# Patient Record
Sex: MALE | Race: WHITE | Employment: FULL TIME | URBAN - METROPOLITAN AREA
[De-identification: names, ages, dates, MRNs, and addresses within clinical notes are randomized per-mention and may not be internally consistent; named-entity substitution may affect disease eponyms.]

---

## 2017-03-27 ENCOUNTER — TELEPHONE (OUTPATIENT)
Dept: FAMILY MEDICINE | Facility: OTHER | Age: 42
End: 2017-03-27

## 2017-03-27 DIAGNOSIS — K64.4 EXTERNAL HEMORRHOIDS: Primary | ICD-10-CM

## 2017-03-27 NOTE — TELEPHONE ENCOUNTER
Advised to call back directly if there are further questions, or if these symptoms fail to improve as anticipated or worsen.

## 2017-06-07 DIAGNOSIS — F41.0 PANIC DISORDER WITHOUT AGORAPHOBIA: ICD-10-CM

## 2017-06-07 RX ORDER — LORAZEPAM 1 MG/1
TABLET ORAL
Qty: 30 TABLET | Refills: 0 | Status: SHIPPED | OUTPATIENT
Start: 2017-06-07 | End: 2017-09-01

## 2017-09-01 DIAGNOSIS — F41.0 PANIC DISORDER WITHOUT AGORAPHOBIA: ICD-10-CM

## 2017-09-01 RX ORDER — LORAZEPAM 1 MG/1
TABLET ORAL
Qty: 30 TABLET | Refills: 0 | Status: SHIPPED | OUTPATIENT
Start: 2017-09-01 | End: 2017-11-21

## 2017-09-01 NOTE — TELEPHONE ENCOUNTER
Ativan      Last Written Prescription Date: 6-7-2017  Last Fill Quantity: 30,  # refills: 0   Last Office Visit with G, [facility] or [facility] prescribing provider: 11-

## 2017-10-25 DIAGNOSIS — K21.00 GASTROESOPHAGEAL REFLUX DISEASE WITH ESOPHAGITIS: ICD-10-CM

## 2017-10-27 NOTE — TELEPHONE ENCOUNTER
Omeprazole      Last Written Prescription Date: 11/23/16  Last Fill Quantity: 90,  # refills: 3   Last Office Visit with G, P or [facility] prescribing provider: 11/23/16

## 2017-10-30 RX ORDER — OMEPRAZOLE 40 MG/1
CAPSULE, DELAYED RELEASE ORAL
Qty: 90 CAPSULE | Refills: 0 | Status: SHIPPED | OUTPATIENT
Start: 2017-10-30 | End: 2018-01-28

## 2017-11-21 DIAGNOSIS — F41.0 PANIC DISORDER WITHOUT AGORAPHOBIA: ICD-10-CM

## 2017-11-22 RX ORDER — LORAZEPAM 1 MG/1
TABLET ORAL
Qty: 30 TABLET | Refills: 0 | Status: SHIPPED | OUTPATIENT
Start: 2017-11-22 | End: 2018-02-08

## 2017-11-22 NOTE — TELEPHONE ENCOUNTER
Ativan      Last Written Prescription Date: 9/1/17  Last Fill Quantity: 30,  # refills: 0   Last Office Visit with G, [facility] or [facility] prescribing provider: 11/23/16

## 2018-01-28 DIAGNOSIS — K21.00 GASTROESOPHAGEAL REFLUX DISEASE WITH ESOPHAGITIS: ICD-10-CM

## 2018-01-29 RX ORDER — OMEPRAZOLE 40 MG/1
CAPSULE, DELAYED RELEASE ORAL
Qty: 90 CAPSULE | Refills: 0 | Status: SHIPPED | OUTPATIENT
Start: 2018-01-29 | End: 2018-04-29

## 2018-02-08 DIAGNOSIS — F41.0 PANIC DISORDER WITHOUT AGORAPHOBIA: ICD-10-CM

## 2018-02-09 RX ORDER — LORAZEPAM 1 MG/1
TABLET ORAL
Qty: 30 TABLET | Refills: 0 | Status: SHIPPED | OUTPATIENT
Start: 2018-02-09 | End: 2018-05-14

## 2018-02-09 NOTE — TELEPHONE ENCOUNTER
Ativan      Last Written Prescription Date:  11/22/17  Last Fill Quantity: 30,   # refills: 0  Last Office Visit: 11/23/16  Future Office visit:       Routing refill request to provider for review/approval because:  Drug not on the [facility], P or [facility] refill protocol or controlled substance

## 2018-04-29 DIAGNOSIS — K21.00 GASTROESOPHAGEAL REFLUX DISEASE WITH ESOPHAGITIS: ICD-10-CM

## 2018-04-30 RX ORDER — OMEPRAZOLE 40 MG/1
CAPSULE, DELAYED RELEASE ORAL
Qty: 30 CAPSULE | Refills: 0 | Status: SHIPPED | OUTPATIENT
Start: 2018-04-30 | End: 2018-05-14

## 2018-04-30 NOTE — TELEPHONE ENCOUNTER
Patient is due for a fu appointment.  A letter was sent out 1/2018.  Patient is scheduled for 5/14/18.  Kasie Milian

## 2018-05-14 ENCOUNTER — OFFICE VISIT (OUTPATIENT)
Dept: FAMILY MEDICINE | Facility: OTHER | Age: 43
End: 2018-05-14
Attending: NURSE PRACTITIONER
Payer: COMMERCIAL

## 2018-05-14 VITALS
RESPIRATION RATE: 14 BRPM | HEART RATE: 80 BPM | BODY MASS INDEX: 32.28 KG/M2 | DIASTOLIC BLOOD PRESSURE: 70 MMHG | WEIGHT: 238 LBS | SYSTOLIC BLOOD PRESSURE: 100 MMHG

## 2018-05-14 DIAGNOSIS — S39.012A STRAIN OF LUMBAR REGION, INITIAL ENCOUNTER: ICD-10-CM

## 2018-05-14 DIAGNOSIS — J30.1 CHRONIC SEASONAL ALLERGIC RHINITIS DUE TO POLLEN: Primary | ICD-10-CM

## 2018-05-14 DIAGNOSIS — F41.0 PANIC DISORDER WITHOUT AGORAPHOBIA: ICD-10-CM

## 2018-05-14 DIAGNOSIS — K21.00 GASTROESOPHAGEAL REFLUX DISEASE WITH ESOPHAGITIS: ICD-10-CM

## 2018-05-14 DIAGNOSIS — Z71.6 TOBACCO ABUSE COUNSELING: ICD-10-CM

## 2018-05-14 DIAGNOSIS — Z72.0 TOBACCO ABUSE: ICD-10-CM

## 2018-05-14 DIAGNOSIS — I10 ESSENTIAL HYPERTENSION, BENIGN: ICD-10-CM

## 2018-05-14 DIAGNOSIS — R79.89 LFT ELEVATION: ICD-10-CM

## 2018-05-14 LAB
ALBUMIN SERPL-MCNC: 4.2 G/DL (ref 3.4–5)
ALP SERPL-CCNC: 62 U/L (ref 40–150)
ALT SERPL W P-5'-P-CCNC: 242 U/L (ref 0–70)
ANION GAP SERPL CALCULATED.3IONS-SCNC: 9 MMOL/L (ref 3–14)
AST SERPL W P-5'-P-CCNC: 141 U/L (ref 0–45)
BILIRUB SERPL-MCNC: 0.5 MG/DL (ref 0.2–1.3)
BUN SERPL-MCNC: 13 MG/DL (ref 7–30)
CALCIUM SERPL-MCNC: 8.7 MG/DL (ref 8.5–10.1)
CHLORIDE SERPL-SCNC: 106 MMOL/L (ref 94–109)
CHOLEST SERPL-MCNC: 147 MG/DL
CO2 SERPL-SCNC: 24 MMOL/L (ref 20–32)
CREAT SERPL-MCNC: 0.8 MG/DL (ref 0.66–1.25)
GFR SERPL CREATININE-BSD FRML MDRD: >90 ML/MIN/1.7M2
GLUCOSE SERPL-MCNC: 94 MG/DL (ref 70–99)
HDLC SERPL-MCNC: 32 MG/DL
LDLC SERPL CALC-MCNC: 95 MG/DL
NONHDLC SERPL-MCNC: 115 MG/DL
POTASSIUM SERPL-SCNC: 3.8 MMOL/L (ref 3.4–5.3)
PROT SERPL-MCNC: 8.7 G/DL (ref 6.8–8.8)
SODIUM SERPL-SCNC: 139 MMOL/L (ref 133–144)
TRIGL SERPL-MCNC: 100 MG/DL
TSH SERPL DL<=0.005 MIU/L-ACNC: 1.25 MU/L (ref 0.4–4)

## 2018-05-14 PROCEDURE — 99214 OFFICE O/P EST MOD 30 MIN: CPT | Performed by: NURSE PRACTITIONER

## 2018-05-14 PROCEDURE — 80053 COMPREHEN METABOLIC PANEL: CPT | Performed by: NURSE PRACTITIONER

## 2018-05-14 PROCEDURE — 80061 LIPID PANEL: CPT | Performed by: NURSE PRACTITIONER

## 2018-05-14 PROCEDURE — 84443 ASSAY THYROID STIM HORMONE: CPT | Performed by: NURSE PRACTITIONER

## 2018-05-14 PROCEDURE — 36415 COLL VENOUS BLD VENIPUNCTURE: CPT | Performed by: NURSE PRACTITIONER

## 2018-05-14 RX ORDER — MONTELUKAST SODIUM 10 MG/1
10 TABLET ORAL AT BEDTIME
Qty: 90 TABLET | Refills: 1 | Status: SHIPPED | OUTPATIENT
Start: 2018-05-14 | End: 2018-10-23

## 2018-05-14 RX ORDER — LISINOPRIL 10 MG/1
TABLET ORAL
Qty: 90 TABLET | Refills: 1 | Status: SHIPPED | OUTPATIENT
Start: 2018-05-14 | End: 2018-10-23

## 2018-05-14 RX ORDER — LORAZEPAM 1 MG/1
TABLET ORAL
Qty: 30 TABLET | Refills: 0 | Status: SHIPPED | OUTPATIENT
Start: 2018-05-14 | End: 2018-06-20

## 2018-05-14 RX ORDER — IBUPROFEN 800 MG/1
800 TABLET, FILM COATED ORAL EVERY 8 HOURS PRN
Qty: 90 TABLET | Refills: 1 | Status: SHIPPED | OUTPATIENT
Start: 2018-05-14 | End: 2019-09-11

## 2018-05-14 RX ORDER — OMEPRAZOLE 40 MG/1
CAPSULE, DELAYED RELEASE ORAL
Qty: 90 CAPSULE | Refills: 1 | Status: SHIPPED | OUTPATIENT
Start: 2018-05-14 | End: 2018-11-03

## 2018-05-14 RX ORDER — AMLODIPINE BESYLATE 10 MG/1
10 TABLET ORAL DAILY
Qty: 90 TABLET | Refills: 1 | Status: SHIPPED | OUTPATIENT
Start: 2018-05-14 | End: 2018-10-23

## 2018-05-14 ASSESSMENT — ANXIETY QUESTIONNAIRES
IF YOU CHECKED OFF ANY PROBLEMS ON THIS QUESTIONNAIRE, HOW DIFFICULT HAVE THESE PROBLEMS MADE IT FOR YOU TO DO YOUR WORK, TAKE CARE OF THINGS AT HOME, OR GET ALONG WITH OTHER PEOPLE: NOT DIFFICULT AT ALL
4. TROUBLE RELAXING: NOT AT ALL
7. FEELING AFRAID AS IF SOMETHING AWFUL MIGHT HAPPEN: NOT AT ALL
3. WORRYING TOO MUCH ABOUT DIFFERENT THINGS: NOT AT ALL
1. FEELING NERVOUS, ANXIOUS, OR ON EDGE: SEVERAL DAYS
2. NOT BEING ABLE TO STOP OR CONTROL WORRYING: NOT AT ALL
5. BEING SO RESTLESS THAT IT IS HARD TO SIT STILL: SEVERAL DAYS
GAD7 TOTAL SCORE: 2
6. BECOMING EASILY ANNOYED OR IRRITABLE: NOT AT ALL

## 2018-05-14 ASSESSMENT — PAIN SCALES - GENERAL: PAINLEVEL: NO PAIN (0)

## 2018-05-14 NOTE — PATIENT INSTRUCTIONS
1. Essential hypertension, benign  - lisinopril (PRINIVIL/ZESTRIL) 10 MG tablet; TAKE 1 TABLET DAILY  Dispense: 90 tablet; Refill: 1  - amLODIPine (NORVASC) 10 MG tablet; Take 1 tablet (10 mg) by mouth daily  Dispense: 90 tablet; Refill: 1  - TSH with free T4 reflex  - Lipid Profile  - Comprehensive metabolic panel    2. Panic disorder without agoraphobia  - LORazepam (ATIVAN) 1 MG tablet; TAKE 1 TABLET BY MOUTH DAILY AS NEEDED FOR ANXIETY  Dispense: 30 tablet; Refill: 0    3. Strain of lumbar region, initial encounter  - ibuprofen (ADVIL/MOTRIN) 800 MG tablet; Take 1 tablet (800 mg) by mouth every 8 hours as needed for moderate pain  Dispense: 90 tablet; Refill: 1    4. Chronic seasonal allergic rhinitis due to pollen  - montelukast (SINGULAIR) 10 MG tablet; Take 1 tablet (10 mg) by mouth At Bedtime  Dispense: 90 tablet; Refill: 1    5. Tobacco abuse  - Tobacco Cessation - Order to Satisfy Health Maintenance    6. Tobacco abuse counseling  - Tobacco Cessation - Order to Satisfy Health Maintenance    7. Gastroesophageal reflux disease with esophagitis  - omeprazole (PRILOSEC) 40 MG capsule; TAKE 1 CAPSULE DAILY 30 TO 60 MINUTES BEFORE A MEAL  Dispense: 90 capsule; Refill: 1        My nurse will call you with your labs  FU 6 months          Lorena Blake NP  [facility]        HOW TO QUIT SMOKING  Smoking is one of the hardest habits to break. About half of all those who have ever smoked have been able to quit, and most of those (about 70%) who still smoke want to quit. Here are some of the best ways to stop smoking.     KEEP TRYING:  It takes most smokers about 8 tries before they are finally able to fully quit. So, the more often you try and fail, the better your chance of quitting the next time! So, don't give up!    GO COLD TURKEY:  Most ex-smokers quit cold turkey. Trying to cut back gradually doesn't seem to work as well, perhaps because it continues the smoking habit. Also, it is possible to  fool yourself by inhaling more while smoking fewer cigarettes. This results in the same amount of nicotine in your body!    GET SUPPORT:  Support programs can make an important difference, especially for the heavy smoker. These groups offer lectures, methods to change your behavior and peer support. Call the free national Quitline for more information. 800-QUIT-NOW (218-358-7758). Low-cost or free programs are offered by many hospitals, local chapters of the American Lung Association (026-572-0688) and the American Cancer Society (351-131-5896). Support at home is important too. Non-smokers can help by offering praise and encouragement. If the smoker fails to quit, encourage them to try again!    OVER-THE-COUNTER MEDICINES:  For those who can't quit on their own, Nicotine Replacement Therapy (NRT) may make quitting much easier. Certain aids such as the nicotine patch, gum and lozenge are available without a prescription. However, it is best to use these under the guidance of your doctor. The skin patch provides a steady supply of nicotine to the body. Nicotine gum and lozenge gives temporary bursts of low levels of nicotine. Both methods take the edge off the craving for cigarettes. WARNING: If you feel symptoms of nicotine overdose, such as nausea, vomiting, dizziness, weakness, or fast heartbeat, stop using these and see your doctor.    PRESCRIPTION MEDICINES:  After evaluating your smoking patterns and prior attempts at quitting, your doctor may offer a prescription medicine such as bupropion (Zyban, Wellbutrin), varenicline (Chantix, Champix), a niocotine inhaler or nasal spray. Each has its unique advantage and side effects which your doctor can review with you.    HEALTH BENEFITS OF QUITTING:  The benefits of quitting start right away and keep improving the longer you go without smokin minutes: blood pressure and pulse return to normal  8 hours: oxygen levels return to normal  2 days: ability to smell  and taste begins to improve as damaged nerves start to regrow  2-3 weeks: circulation and lung function improves  1-9 months: decreased cough, congestion and shortness of breath; less tired  1 year: risk of heart attack decreases by half  5 years: risk of lung cancer decreases by half; risk of stroke becomes the same as a non-smoker  For information about how to quit smoking, visit the following links:  National Cancer [place] ,   Clearing the Air, Quit Smoking Today   - an online booklet. http://www.smokefree.gov/pubs/clearing_the_air.pdf  Smokefree.gov http://smokefree.gov/  QuitNet http://www.quitnet.com/    1514-5239 Sharlatammy Enrike, [address] [place] [address], [place] [id number]. All rights reserved. This information is not intended as a substitute for professional medical care. Always follow your healthcare professional's instructions.    The Benefits of Living Smoke Free  What do you want to gain from quitting? Check off some reasons to quit.  Health Benefits  ___ Reduce my risk of lung cancer, heart disease, chronic lung disease  ___ Have fewer wrinkles and softer skin  ___ Improve my sense of taste and smell  ___ For pregnant women reduce the risk of having a miscarriage, stillbirth, premature birth, or low-birth-weight baby  Personal Benefits  ___ Feel more in control of my life  ___ Have better-smelling hair, breath, clothes, home, and car  ___ Save time by not having to take smoke breaks, buy cigarettes, or hunt for a light  ___ Have whiter teeth  Family Benefits  ___ Reduce my children s respiratory tract infections  ___ Set a good example for my children  ___ Reduce my family s cancer risk  Financial Benefits  ___ Save hundreds of dollars each year that would be spent on cigarettes  ___ Save money on medical bills  ___ Save on life, health, and car insurance premiums    Those Dollars Add Up!  Cigarettes are expensive, and getting more expensive all the time. Do you realize how much money you are spending  on cigarettes per year? What is the average amount you spend on a pack of cigarettes? What is the average number of packs that you smoke per day? Using your answers to these questions, fill in this formula to help you find out:  ($ _____ per pack) ×  ( _____ number of packs per day) × (365 days) =  $ _____ yearly cost of smoking  Besides tobacco, there are other costs, including extra cleaning bills and replacement costs for clothing and furniture; medical expenses for smoking-related illnesses; and higher health, life, and car insurance premiums.    Cigars and Pipes Count Too!  Cigars and pipes are also dangerous. So are smokeless (chewing) tobacco and snuff. All of these products contain nicotine, a highly addictive substance that has harmful effects on your body. Quitting smoking means giving up all tobacco products.      5324-5510 Sheila \A Chronology [facility] [place] [facility]\"", [address] [place] [address], [place] [address]. All rights reserved. This information is not intended as a substitute for professional medical care. Always follow your healthcare professional's instructions.

## 2018-05-14 NOTE — MR AVS SNAPSHOT
After Visit Summary   5/14/2018    Deepak Quevedo    MRN: [id number]           Patient Information     Date Of Birth          1975        Visit Information        Provider Department      5/14/2018 9:45 AM Lorena Blake NP [facility]        Today's Diagnoses     Chronic seasonal allergic rhinitis due to pollen    -  1    Essential hypertension, benign        Panic disorder without agoraphobia        Strain of lumbar region, initial encounter        Tobacco abuse        Tobacco abuse counseling        Gastroesophageal reflux disease with esophagitis          Care Instructions        1. Essential hypertension, benign  - lisinopril (PRINIVIL/ZESTRIL) 10 MG tablet; TAKE 1 TABLET DAILY  Dispense: 90 tablet; Refill: 1  - amLODIPine (NORVASC) 10 MG tablet; Take 1 tablet (10 mg) by mouth daily  Dispense: 90 tablet; Refill: 1  - TSH with free T4 reflex  - Lipid Profile  - Comprehensive metabolic panel    2. Panic disorder without agoraphobia  - LORazepam (ATIVAN) 1 MG tablet; TAKE 1 TABLET BY MOUTH DAILY AS NEEDED FOR ANXIETY  Dispense: 30 tablet; Refill: 0    3. Strain of lumbar region, initial encounter  - ibuprofen (ADVIL/MOTRIN) 800 MG tablet; Take 1 tablet (800 mg) by mouth every 8 hours as needed for moderate pain  Dispense: 90 tablet; Refill: 1    4. Chronic seasonal allergic rhinitis due to pollen  - montelukast (SINGULAIR) 10 MG tablet; Take 1 tablet (10 mg) by mouth At Bedtime  Dispense: 90 tablet; Refill: 1    5. Tobacco abuse  - Tobacco Cessation - Order to Satisfy Health Maintenance    6. Tobacco abuse counseling  - Tobacco Cessation - Order to Satisfy Health Maintenance    7. Gastroesophageal reflux disease with esophagitis  - omeprazole (PRILOSEC) 40 MG capsule; TAKE 1 CAPSULE DAILY 30 TO 60 MINUTES BEFORE A MEAL  Dispense: 90 capsule; Refill: 1        My nurse will call you with your labs  FU 6 months          Lorena Blake NP  [facility]        HOW TO QUIT  SMOKING  Smoking is one of the hardest habits to break. About half of all those who have ever smoked have been able to quit, and most of those (about 70%) who still smoke want to quit. Here are some of the best ways to stop smoking.     KEEP TRYING:  It takes most smokers about 8 tries before they are finally able to fully quit. So, the more often you try and fail, the better your chance of quitting the next time! So, don't give up!    GO COLD TURKEY:  Most ex-smokers quit cold turkey. Trying to cut back gradually doesn't seem to work as well, perhaps because it continues the smoking habit. Also, it is possible to fool yourself by inhaling more while smoking fewer cigarettes. This results in the same amount of nicotine in your body!    GET SUPPORT:  Support programs can make an important difference, especially for the heavy smoker. These groups offer lectures, methods to change your behavior and peer support. Call the free national Quitline for more information. 800-QUIT-NOW (143-994-1093). Low-cost or free programs are offered by many hospitals, local chapters of the American Lung Association (291-387-8083) and the American Cancer Society (730-656-7986). Support at home is important too. Non-smokers can help by offering praise and encouragement. If the smoker fails to quit, encourage them to try again!    OVER-THE-COUNTER MEDICINES:  For those who can't quit on their own, Nicotine Replacement Therapy (NRT) may make quitting much easier. Certain aids such as the nicotine patch, gum and lozenge are available without a prescription. However, it is best to use these under the guidance of your doctor. The skin patch provides a steady supply of nicotine to the body. Nicotine gum and lozenge gives temporary bursts of low levels of nicotine. Both methods take the edge off the craving for cigarettes. WARNING: If you feel symptoms of nicotine overdose, such as nausea, vomiting, dizziness, weakness, or fast heartbeat, stop  using these and see your doctor.    PRESCRIPTION MEDICINES:  After evaluating your smoking patterns and prior attempts at quitting, your doctor may offer a prescription medicine such as bupropion (Zyban, Wellbutrin), varenicline (Chantix, Champix), a niocotine inhaler or nasal spray. Each has its unique advantage and side effects which your doctor can review with you.    HEALTH BENEFITS OF QUITTING:  The benefits of quitting start right away and keep improving the longer you go without smokin minutes: blood pressure and pulse return to normal  8 hours: oxygen levels return to normal  2 days: ability to smell and taste begins to improve as damaged nerves start to regrow  2-3 weeks: circulation and lung function improves  1-9 months: decreased cough, congestion and shortness of breath; less tired  1 year: risk of heart attack decreases by half  5 years: risk of lung cancer decreases by half; risk of stroke becomes the same as a non-smoker  For information about how to quit smoking, visit the following links:  National Cancer [place] ,   Clearing the Air, Quit Smoking Today   - an online booklet. http://www.smokefree.gov/pubs/clearing_the_air.pdf  Smokefree.gov http://smokefree.gov/  QuitNet http://www.quitnet.com/    3380-9220 [facility], [address] [place] [address], [place] [address]. All rights reserved. This information is not intended as a substitute for professional medical care. Always follow your healthcare professional's instructions.    The Benefits of Living Smoke Free  What do you want to gain from quitting? Check off some reasons to quit.  Health Benefits  ___ Reduce my risk of lung cancer, heart disease, chronic lung disease  ___ Have fewer wrinkles and softer skin  ___ Improve my sense of taste and smell  ___ For pregnant women--reduce the risk of having a miscarriage, stillbirth, premature birth, or low-birth-weight baby  Personal Benefits  ___ Feel more in control of my life  ___ Have  better-smelling hair, breath, clothes, home, and car  ___ Save time by not having to take smoke breaks, buy cigarettes, or hunt for a light  ___ Have whiter teeth  Family Benefits  ___ Reduce my children s respiratory tract infections  ___ Set a good example for my children  ___ Reduce my family s cancer risk  Financial Benefits  ___ Save hundreds of dollars each year that would be spent on cigarettes  ___ Save money on medical bills  ___ Save on life, health, and car insurance premiums    Those Dollars Add Up!  Cigarettes are expensive, and getting more expensive all the time. Do you realize how much money you are spending on cigarettes per year? What is the average amount you spend on a pack of cigarettes? What is the average number of packs that you smoke per day? Using your answers to these questions, fill in this formula to help you find out:  ($ _____ per pack) ×  ( _____ number of packs per day) × (365 days) =  $ _____ yearly cost of smoking  Besides tobacco, there are other costs, including extra cleaning bills and replacement costs for clothing and furniture; medical expenses for smoking-related illnesses; and higher health, life, and car insurance premiums.    Cigars and Pipes Count Too!  Cigars and pipes are also dangerous. So are smokeless (chewing) tobacco and snuff. All of these products contain nicotine, a highly addictive substance that has harmful effects on your body. Quitting smoking means giving up all tobacco products.      5864-1132 [address], [place] [address]. All rights reserved. This information is not intended as a substitute for professional medical care. Always follow your healthcare professional's instructions.          Follow-ups after your visit        Who to contact     If you have questions or need follow up information about today's clinic visit or your schedule please contact [facility] directly at [phone].  Normal or non-critical  "lab and imaging results will be communicated to you by MyChart, letter or phone within 4 business days after the clinic has received the results. If you do not hear from us within 7 days, please contact the clinic through [employer] or phone. If you have a critical or abnormal lab result, we will notify you by phone as soon as possible.  Submit refill requests through [employer] or call your pharmacy and they will forward the refill request to us. Please allow 3 business days for your refill to be completed.          Additional Information About Your Visit        [employer] Information     [employer] lets you send messages to your doctor, view your test results, renew your prescriptions, schedule appointments and more. To sign up, go to [email or web address] . Click on \"Log in\" on the left side of the screen, which will take you to the Welcome page. Then click on \"Sign up Now\" on the right side of the page.     You will be asked to enter the access code listed below, as well as some personal information. Please follow the directions to create your username and password.     Your access code is: [email or web address]  Expires: 2018 10:18 AM     Your access code will  in 90 days. If you need help or a new code, please call your [place] clinic or [phone].        Care EveryWhere ID     This is your Care EveryWhere ID. This could be used by other organizations to access your [place] medical records  [id number]        Your Vitals Were     Pulse Respirations BMI (Body Mass Index)             80 14 32.28 kg/m2          Blood Pressure from Last 3 Encounters:   18 100/70   16 108/66   09/14/15 122/82    Weight from Last 3 Encounters:   18 238 lb (108 kg)   16 241 lb (109.3 kg)   09/14/15 230 lb (104.3 kg)              We Performed the Following     Comprehensive metabolic panel     Lipid Profile     Tobacco Cessation - Order to Satisfy Health Maintenance     TSH with free T4 reflex          Today's " Medication Changes          These changes are accurate as of 5/14/18 10:18 AM.  If you have any questions, ask your nurse or doctor.               These medicines have changed or have updated prescriptions.        Dose/Directions    amLODIPine 10 MG tablet   Commonly known as:  NORVASC   This may have changed:  See the new instructions.   Used for:  Essential hypertension, benign   Changed by:  Lorena Blake NP        Dose:  10 mg   Take 1 tablet (10 mg) by mouth daily   Quantity:  90 tablet   Refills:  1       lisinopril 10 MG tablet   Commonly known as:  PRINIVIL/ZESTRIL   This may have changed:  See the new instructions.   Used for:  Essential hypertension, benign   Changed by:  Lorena Blake NP        TAKE 1 TABLET DAILY   Quantity:  90 tablet   Refills:  1       LORazepam 1 MG tablet   Commonly known as:  ATIVAN   This may have changed:  See the new instructions.   Used for:  Panic disorder without agoraphobia   Changed by:  Lorena Blake NP        TAKE 1 TABLET BY MOUTH DAILY AS NEEDED FOR ANXIETY   Quantity:  30 tablet   Refills:  0       montelukast 10 MG tablet   Commonly known as:  SINGULAIR   This may have changed:  Another medication with the same name was removed. Continue taking this medication, and follow the directions you see here.   Used for:  Chronic seasonal allergic rhinitis due to pollen   Changed by:  Lorena Blake NP        Dose:  10 mg   Take 1 tablet (10 mg) by mouth At Bedtime   Quantity:  90 tablet   Refills:  1       omeprazole 40 MG capsule   Commonly known as:  priLOSEC   This may have changed:  See the new instructions.   Used for:  Gastroesophageal reflux disease with esophagitis   Changed by:  Lorena Blake NP        TAKE 1 CAPSULE DAILY 30 TO 60 MINUTES BEFORE A MEAL   Quantity:  90 capsule   Refills:  1            Where to get your medicines      These medications were sent to [facility] - [place], [place] - [address] [place], [place] [address] [facility]  [address]     Phone:  [phone]     amLODIPine 10 MG tablet    ibuprofen 800 MG tablet    lisinopril 10 MG tablet    montelukast 10 MG tablet    omeprazole 40 MG capsule         Some of these will need a paper prescription and others can be bought over the counter.  Ask your nurse if you have questions.     Bring a paper prescription for each of these medications     LORazepam 1 MG tablet                Primary Care Provider Office Phone # Fax #    Lorena Blake [phone] [address]  [place] [address]        Equal Access to Services     [facility]: Hadii aad ku hadasho Soomaali, waaxda luqadaha, qaybta kaalmada adeegyada, waxay elysiain haymaggie laurent . So [facility] [phone].    ATENCIÓN: Si habla español, tiene a massey disposición servicios gratuitos de asistencia lingüística. Llame al [phone].    We comply with applicable federal civil rights laws and [place] laws. We do not discriminate on the basis of race, color, national origin, age, disability, sex, sexual orientation, or gender identity.            Thank you!     Thank you for choosing [facility]  for your care. Our goal is always to provide you with excellent care. Hearing back from our patients is one way we can continue to improve our services. Please take a few minutes to complete the written survey that you may receive in the mail after your visit with us. Thank you!             Your Updated Medication List - Protect others around you: Learn how to safely use, store and throw away your medicines at www.disposemymeds.org.          This list is accurate as of 5/14/18 10:18 AM.  Always use your most recent med list.                   Brand Name Dispense Instructions for use Diagnosis    amLODIPine 10 MG tablet    NORVASC    90 tablet    Take 1 tablet (10 mg) by mouth daily    Essential hypertension, benign       aspirin 81 MG EC tablet     90 tablet    Take 1 tablet (81 mg) by mouth  daily    Essential hypertension, benign       atenolol 25 MG tablet    TENORMIN    15 tablet    TAKE 1 TABLET DAILY    Essential hypertension, benign       ibuprofen 800 MG tablet    ADVIL/MOTRIN    90 tablet    Take 1 tablet (800 mg) by mouth every 8 hours as needed for moderate pain    Strain of lumbar region, initial encounter       lisinopril 10 MG tablet    PRINIVIL/ZESTRIL    90 tablet    TAKE 1 TABLET DAILY    Essential hypertension, benign       LORazepam 1 MG tablet    ATIVAN    30 tablet    TAKE 1 TABLET BY MOUTH DAILY AS NEEDED FOR ANXIETY    Panic disorder without agoraphobia       montelukast 10 MG tablet    SINGULAIR    90 tablet    Take 1 tablet (10 mg) by mouth At Bedtime    Chronic seasonal allergic rhinitis due to pollen       omeprazole 40 MG capsule    priLOSEC    90 capsule    TAKE 1 CAPSULE DAILY 30 TO 60 MINUTES BEFORE A MEAL    Gastroesophageal reflux disease with esophagitis

## 2018-05-14 NOTE — PROGRESS NOTES
Labs look good other than LFT's - which are pretty high.  Ask about alcohol use please, also tylenol use.    Recheck LFT's in 2 weeks, order entered    Lorena MARCANOJames J. [facility]  [phone]

## 2018-05-14 NOTE — NURSING NOTE
Chief Complaint   Patient presents with     Chronic Disease Management     fasting       Initial /70 (BP Location: Right arm, Patient Position: Sitting, Cuff Size: Adult Large)  Pulse 80  Resp 14  Wt 238 lb (108 kg)  BMI 32.28 kg/m2 Estimated body mass index is 32.28 kg/(m^2) as calculated from the following:    Height as of 11/23/16: 6' (1.829 m).    Weight as of this encounter: 238 lb (108 kg).  Medication Reconciliation: complete     Sanjuana López LPN

## 2018-05-14 NOTE — PROGRESS NOTES
SUBJECTIVE:   Deepak Quevedo is a 42 year old male who presents to clinic today for the following health issues:        Hypertension Follow-up    Outpatient blood pressures are not being checked.    Low Salt Diet: not monitoring salt      lost 15 lbs, cutting down on smoking and quit drinking, /70 today      Panic attacks:  Takes ativan as needed, hasn't needed for when flew to [place], takes very rarely      GERD:  Taking omeprazole, doesn't work as well as it uaed to, but still can't go without.            Amount of exercise or physical activity: 6-7 days/week for an average of walking 5-6 hieu daily    Problems taking medications regularly: No    Medication side effects: none    Diet: regular (no restrictions)          Problem list and histories reviewed & adjusted, as indicated.  Additional history: as documented    Patient Active Problem List   Diagnosis     Essential hypertension, benign     GERD (gastroesophageal reflux disease)     Allergic rhinitis     Tobacco abuse     Panic disorder     Past Surgical History:   Procedure Laterality Date     CIRCUMCISION       ESOPHAGOSCOPY, GASTROSCOPY, DUODENOSCOPY (EGD), COMBINED  4/22/2013    Procedure: COMBINED ESOPHAGOSCOPY, GASTROSCOPY, DUODENOSCOPY (EGD);  UPPER ENDOSCOPY with Biopsy;  Surgeon: Ashley Narayanan DO;  Location: [facility]     TONSILLECTOMY         Social History   Substance Use Topics     Smoking status: Current Some Day Smoker     Packs/day: 0.20     Years: 15.00     Types: Cigarettes     Smokeless tobacco: Never Used      Comment: Longest period tobacco-free: 6 months; relapse due to social pressure; passive smoke exposure (yes)     Alcohol use Yes      Comment: 3 beers daily/occasionally (?)     Family History   Problem Relation Age of Onset     CANCER Maternal Grandmother      Cause of death     DIABETES Maternal Grandmother 67     DIABETES Maternal Grandfather 70     DIABETES Father          Current Outpatient Prescriptions   Medication  Sig Dispense Refill     amLODIPine (NORVASC) 10 MG tablet Take 1 tablet (10 mg) by mouth daily 90 tablet 1     aspirin 81 MG EC tablet Take 1 tablet (81 mg) by mouth daily 90 tablet 3     atenolol (TENORMIN) 25 MG tablet TAKE 1 TABLET DAILY 15 tablet 0     ibuprofen (ADVIL/MOTRIN) 800 MG tablet Take 1 tablet (800 mg) by mouth every 8 hours as needed for moderate pain 90 tablet 1     lisinopril (PRINIVIL/ZESTRIL) 10 MG tablet TAKE 1 TABLET DAILY 90 tablet 1     LORazepam (ATIVAN) 1 MG tablet TAKE 1 TABLET BY MOUTH DAILY AS NEEDED FOR ANXIETY 30 tablet 0     montelukast (SINGULAIR) 10 MG tablet Take 1 tablet (10 mg) by mouth At Bedtime 90 tablet 1     omeprazole (PRILOSEC) 40 MG capsule TAKE 1 CAPSULE DAILY 30 TO 60 MINUTES BEFORE A MEAL (DUE FOR OFFICE VISIT) 30 capsule 0     [DISCONTINUED] amLODIPine (NORVASC) 10 MG tablet TAKE 1 TABLET DAILY 15 tablet 0     [DISCONTINUED] lisinopril (PRINIVIL/ZESTRIL) 10 MG tablet TAKE 1 TABLET DAILY (DUE FOR OFFICE VISIT AND LABS) 15 tablet 0     [DISCONTINUED] montelukast (SINGULAIR) 10 MG tablet Take 1 tablet (10 mg) by mouth At Bedtime 30 tablet 0     [DISCONTINUED] montelukast (SINGULAIR) 10 MG tablet TAKE 1 TABLET AT BEDTIME 30 tablet 0     No Known Allergies  Recent Labs   Lab Test  11/23/16   1418  06/04/14   1456  12/12/13   1415   LDL   --   66  101   HDL   --   58  47   TRIG   --   151*  157*   CR  0.92  1.03  1.01   GFRESTIMATED  >90  Non  GFR Calc    82  84   GFRESTBLACK  >90   GFR Calc    >90  >90   POTASSIUM  3.7  4.6  4.9   TSH  1.00  0.59  0.67      BP Readings from Last 3 Encounters:   05/14/18 100/70   11/23/16 108/66   09/14/15 122/82    Wt Readings from Last 3 Encounters:   05/14/18 238 lb (108 kg)   11/23/16 241 lb (109.3 kg)   09/14/15 230 lb (104.3 kg)                  Labs reviewed in EPIC    Reviewed and updated as needed this visit by clinical staff  Tobacco  Allergies  Meds  Med Hx  Surg Hx  Fam Hx  Soc Hx       Reviewed and updated as needed this visit by Provider         ROS:  Constitutional, HEENT, cardiovascular, pulmonary, GI, , musculoskeletal, neuro, skin, endocrine and psych systems are negative, except as otherwise noted.    OBJECTIVE:     /70 (BP Location: Right arm, Patient Position: Sitting, Cuff Size: Adult Large)  Pulse 80  Resp 14  Wt 238 lb (108 kg)  BMI 32.28 kg/m2  Body mass index is 32.28 kg/(m^2).       GENERAL: healthy, alert and no distress  EYES: Eyes grossly normal to inspection, PERRL and conjunctivae and sclerae normal  HENT: ear canals and TM's normal, nose and mouth without ulcers or lesions  NECK: no adenopathy, no asymmetry, masses, or scars and thyroid normal to palpation  RESP: lungs clear to auscultation - no rales, rhonchi or wheezes  CV: regular rate and rhythm, normal S1 S2, no S3 or S4, no murmur, click or rub, no peripheral edema and peripheral pulses strong  ABDOMEN: soft, nontender, no hepatosplenomegaly, no masses and bowel sounds normal  MS: no gross musculoskeletal defects noted, no edema  SKIN: no suspicious lesions or rashes  PSYCH: mentation appears normal, affect normal/bright    Results for orders placed or performed in visit on 05/14/18 (from the past 24 hour(s))   TSH with free T4 reflex   Result Value Ref Range    TSH 1.25 0.40 - 4.00 mU/L   Lipid Profile   Result Value Ref Range    Cholesterol 147 <200 mg/dL    Triglycerides 100 <150 mg/dL    HDL Cholesterol 32 (L) >39 mg/dL    LDL Cholesterol Calculated 95 <100 mg/dL    Non HDL Cholesterol 115 <130 mg/dL   Comprehensive metabolic panel   Result Value Ref Range    Sodium 139 133 - 144 mmol/L    Potassium 3.8 3.4 - 5.3 mmol/L    Chloride 106 94 - 109 mmol/L    Carbon Dioxide 24 20 - 32 mmol/L    Anion Gap 9 3 - 14 mmol/L    Glucose 94 70 - 99 mg/dL    Urea Nitrogen 13 7 - 30 mg/dL    Creatinine 0.80 0.66 - 1.25 mg/dL    GFR Estimate >90 >60 mL/min/1.7m2    GFR Estimate If Black >90 >60 mL/min/1.7m2    Calcium  8.7 8.5 - 10.1 mg/dL    Bilirubin Total 0.5 0.2 - 1.3 mg/dL    Albumin 4.2 3.4 - 5.0 g/dL    Protein Total 8.7 6.8 - 8.8 g/dL    Alkaline Phosphatase 62 40 - 150 U/L     (H) 0 - 70 U/L     (H) 0 - 45 U/L         ASSESSMENT/PLAN:     Hypertension; controlled   Associated with the following complications:    None   Plan:  No changes in the patient's current treatment plan        Tobacco Cessation:   reports that he has been smoking Cigarettes.  He has a 3.00 pack-year smoking history. He has never used smokeless tobacco.  Tobacco Cessation Action Plan: Information offered: Patient not interested at this time  Self help information given to patient          1. Essential hypertension, benign  - lisinopril (PRINIVIL/ZESTRIL) 10 MG tablet; TAKE 1 TABLET DAILY  Dispense: 90 tablet; Refill: 1  - amLODIPine (NORVASC) 10 MG tablet; Take 1 tablet (10 mg) by mouth daily  Dispense: 90 tablet; Refill: 1  - TSH with free T4 reflex  - Lipid Profile  - Comprehensive metabolic panel    2. Panic disorder without agoraphobia  - LORazepam (ATIVAN) 1 MG tablet; TAKE 1 TABLET BY MOUTH DAILY AS NEEDED FOR ANXIETY  Dispense: 30 tablet; Refill: 0    3. Strain of lumbar region, initial encounter  - ibuprofen (ADVIL/MOTRIN) 800 MG tablet; Take 1 tablet (800 mg) by mouth every 8 hours as needed for moderate pain  Dispense: 90 tablet; Refill: 1    4. Chronic seasonal allergic rhinitis due to pollen  - montelukast (SINGULAIR) 10 MG tablet; Take 1 tablet (10 mg) by mouth At Bedtime  Dispense: 90 tablet; Refill: 1    5. Tobacco abuse  - Tobacco Cessation - Order to Satisfy Health Maintenance    6. Tobacco abuse counseling  - Tobacco Cessation - Order to Satisfy Health Maintenance    7. Gastroesophageal reflux disease with esophagitis  - omeprazole (PRILOSEC) 40 MG capsule; TAKE 1 CAPSULE DAILY 30 TO 60 MINUTES BEFORE A MEAL  Dispense: 90 capsule; Refill: 1        My nurse will call you with your labs  FU 6 months          Lorena Blake  NP  [facility]

## 2018-05-15 RX ORDER — LORAZEPAM 1 MG/1
TABLET ORAL
Qty: 30 TABLET | Refills: 0 | OUTPATIENT
Start: 2018-05-15

## 2018-05-15 ASSESSMENT — ANXIETY QUESTIONNAIRES: GAD7 TOTAL SCORE: 2

## 2018-05-15 ASSESSMENT — PATIENT HEALTH QUESTIONNAIRE - PHQ9: SUM OF ALL RESPONSES TO PHQ QUESTIONS 1-9: 4

## 2018-05-15 NOTE — TELEPHONE ENCOUNTER
Ativan  Last Written Prescription Date:  5/14/18  Last Fill Qty:  30, # Refills:  0  Last Office Visit:  5/14/18    Refill denied as it was just filled yesterday.

## 2018-05-30 DIAGNOSIS — R79.89 LFT ELEVATION: ICD-10-CM

## 2018-05-30 DIAGNOSIS — I10 ESSENTIAL HYPERTENSION, BENIGN: ICD-10-CM

## 2018-05-30 LAB
ALBUMIN SERPL-MCNC: 4.2 G/DL (ref 3.4–5)
ALP SERPL-CCNC: 73 U/L (ref 40–150)
ALT SERPL W P-5'-P-CCNC: 89 U/L (ref 0–70)
AST SERPL W P-5'-P-CCNC: 49 U/L (ref 0–45)
BILIRUB DIRECT SERPL-MCNC: 0.1 MG/DL (ref 0–0.2)
BILIRUB SERPL-MCNC: 0.3 MG/DL (ref 0.2–1.3)
PROT SERPL-MCNC: 8.2 G/DL (ref 6.8–8.8)

## 2018-05-30 PROCEDURE — 80076 HEPATIC FUNCTION PANEL: CPT | Performed by: NURSE PRACTITIONER

## 2018-05-30 PROCEDURE — 36415 COLL VENOUS BLD VENIPUNCTURE: CPT | Performed by: NURSE PRACTITIONER

## 2018-05-30 RX ORDER — ATENOLOL 25 MG/1
25 TABLET ORAL DAILY
Qty: 90 TABLET | Refills: 3 | Status: SHIPPED | OUTPATIENT
Start: 2018-05-30 | End: 2019-05-26

## 2018-06-20 DIAGNOSIS — F41.0 PANIC DISORDER WITHOUT AGORAPHOBIA: ICD-10-CM

## 2018-06-20 RX ORDER — LORAZEPAM 1 MG/1
TABLET ORAL
Qty: 30 TABLET | Refills: 0 | Status: SHIPPED | OUTPATIENT
Start: 2018-06-20 | End: 2018-10-23

## 2018-06-20 NOTE — TELEPHONE ENCOUNTER
lorazepam      Last Written Prescription Date:  5/14/18  Last Fill Quantity: 30,   # refills: 0  Last Office Visit: 5/14/18  Future Office visit:       Routing refill request to provider for review/approval because:  Drug not on the [facility], P or [facility] refill protocol or controlled substance

## 2018-10-23 DIAGNOSIS — J30.1 CHRONIC SEASONAL ALLERGIC RHINITIS DUE TO POLLEN: ICD-10-CM

## 2018-10-23 DIAGNOSIS — I10 ESSENTIAL HYPERTENSION, BENIGN: ICD-10-CM

## 2018-10-23 DIAGNOSIS — F41.0 PANIC DISORDER WITHOUT AGORAPHOBIA: ICD-10-CM

## 2018-10-23 RX ORDER — LORAZEPAM 1 MG/1
TABLET ORAL
Qty: 30 TABLET | Refills: 0 | Status: SHIPPED | OUTPATIENT
Start: 2018-10-23 | End: 2018-12-30

## 2018-10-23 NOTE — TELEPHONE ENCOUNTER
Lorazepam      Last Written Prescription Date:  6/20/18  Last Fill Quantity: 30,   # refills: 0  Last Office Visit: 5/14/18  Future Office visit:

## 2018-10-24 RX ORDER — AMLODIPINE BESYLATE 10 MG/1
TABLET ORAL
Qty: 90 TABLET | Refills: 1 | Status: SHIPPED | OUTPATIENT
Start: 2018-10-24 | End: 2019-04-21

## 2018-10-24 RX ORDER — LISINOPRIL 10 MG/1
TABLET ORAL
Qty: 90 TABLET | Refills: 1 | Status: SHIPPED | OUTPATIENT
Start: 2018-10-24 | End: 2019-04-21

## 2018-10-24 RX ORDER — MONTELUKAST SODIUM 10 MG/1
TABLET ORAL
Qty: 90 TABLET | Refills: 1 | Status: SHIPPED | OUTPATIENT
Start: 2018-10-24 | End: 2019-04-21

## 2018-12-30 DIAGNOSIS — F41.0 PANIC DISORDER WITHOUT AGORAPHOBIA: ICD-10-CM

## 2018-12-31 RX ORDER — LORAZEPAM 1 MG/1
TABLET ORAL
Qty: 30 TABLET | Refills: 0 | Status: SHIPPED | OUTPATIENT
Start: 2018-12-31 | End: 2019-02-25

## 2018-12-31 NOTE — TELEPHONE ENCOUNTER
LORAZEPAM 1MG TABLETS      Last Written Prescription Date:  10/23/18  Last Fill Quantity: 30,   # refills: 0  Last Office Visit: 5/14/18  Future Office visit:

## 2019-02-25 DIAGNOSIS — F41.0 PANIC DISORDER WITHOUT AGORAPHOBIA: ICD-10-CM

## 2019-02-26 RX ORDER — LORAZEPAM 1 MG/1
TABLET ORAL
Qty: 30 TABLET | Refills: 0 | Status: SHIPPED | OUTPATIENT
Start: 2019-02-26 | End: 2019-07-31

## 2019-04-21 DIAGNOSIS — J30.1 CHRONIC SEASONAL ALLERGIC RHINITIS DUE TO POLLEN: ICD-10-CM

## 2019-04-21 DIAGNOSIS — I10 ESSENTIAL HYPERTENSION, BENIGN: ICD-10-CM

## 2019-04-21 NOTE — LETTER
April 23, 2019      Deepak Quevedo  [address] [place] [id number]        Dear Deepak,       APPOINTMENT REMINDER:   Our records indicates that it is time for you to be seen for yearly exam     Your current medication requests will be approved for one refill but you will need to be seen before any additional refills can be approved.  Taking care of your health is important to us, and ongoing visits with your provider are vital to your care.    We look forward to seeing you in the near future.  You may call our office at [phone] to schedule a visit.     Please disregard this notice if you have already made an appointment.      Sincerely,        Lorena Blake NP

## 2019-04-23 RX ORDER — AMLODIPINE BESYLATE 10 MG/1
TABLET ORAL
Qty: 60 TABLET | Refills: 0 | Status: SHIPPED | OUTPATIENT
Start: 2019-04-23 | End: 2019-06-23

## 2019-04-23 RX ORDER — LISINOPRIL 10 MG/1
TABLET ORAL
Qty: 60 TABLET | Refills: 0 | Status: SHIPPED | OUTPATIENT
Start: 2019-04-23 | End: 2019-06-21

## 2019-04-23 RX ORDER — MONTELUKAST SODIUM 10 MG/1
TABLET ORAL
Qty: 60 TABLET | Refills: 0 | Status: SHIPPED | OUTPATIENT
Start: 2019-04-23 | End: 2019-06-21

## 2019-04-23 NOTE — TELEPHONE ENCOUNTER
Montelukast 10 mg      Last Written Prescription Date:  10/24/18  Last Fill Quantity: 90,   # refills: 1  Last Office Visit: 05/14/18  Future Office visit:       Amlodipine 10 mg      Last Written Prescription Date:  10/24/18  Last Fill Quantity: 90,   # refills: 1    Lisinopril 10 mg      Last Written Prescription Date:  10/24/18  Last Fill Quantity: 90,   # refills: 1

## 2019-05-01 DIAGNOSIS — K21.00 GASTROESOPHAGEAL REFLUX DISEASE WITH ESOPHAGITIS: ICD-10-CM

## 2019-05-02 RX ORDER — OMEPRAZOLE 40 MG/1
CAPSULE, DELAYED RELEASE ORAL
Qty: 90 CAPSULE | Refills: 0 | Status: SHIPPED | OUTPATIENT
Start: 2019-05-02 | End: 2019-07-30

## 2019-06-23 DIAGNOSIS — I10 ESSENTIAL HYPERTENSION, BENIGN: ICD-10-CM

## 2019-06-24 RX ORDER — AMLODIPINE BESYLATE 10 MG/1
TABLET ORAL
Qty: 20 TABLET | Refills: 0 | Status: SHIPPED | OUTPATIENT
Start: 2019-06-24 | End: 2019-08-28

## 2019-07-30 DIAGNOSIS — K21.00 GASTROESOPHAGEAL REFLUX DISEASE WITH ESOPHAGITIS: ICD-10-CM

## 2019-07-31 DIAGNOSIS — F41.0 PANIC DISORDER WITHOUT AGORAPHOBIA: ICD-10-CM

## 2019-07-31 RX ORDER — OMEPRAZOLE 40 MG/1
CAPSULE, DELAYED RELEASE ORAL
Qty: 30 CAPSULE | Refills: 0 | Status: SHIPPED | OUTPATIENT
Start: 2019-07-31 | End: 2019-09-06

## 2019-08-01 RX ORDER — LORAZEPAM 1 MG/1
TABLET ORAL
Qty: 30 TABLET | Refills: 0 | Status: SHIPPED | OUTPATIENT
Start: 2019-08-01 | End: 2019-08-28

## 2019-08-01 NOTE — TELEPHONE ENCOUNTER
LORazepam (ATIVAN) 1 MG tablet      Last Written Prescription Date:  2/26/19  Last Fill Quantity: 30,   # refills: 0  Last Office Visit: 5/14/18  Future Office visit:    Next 5 appointments (look out 90 days)    Aug 28, 2019  3:00 PM CDT  (Arrive by 2:45 PM)  PHYSICAL with Lorena Blake NP  [facility] ([facility] ) [address]  [facility] [address]  [phone]           Routing refill request to provider for review/approval because:  Drug not on the [facility], [facility] or [facility] refill protocol or controlled substance

## 2019-08-19 DIAGNOSIS — I10 ESSENTIAL HYPERTENSION, BENIGN: ICD-10-CM

## 2019-08-21 NOTE — TELEPHONE ENCOUNTER
atenolol (TENORMIN) 25 MG tablet    Last Written Prescription Date:  05/29/2019  Last Fill Quantity: 30,   # refills: 0  Last Office Visit: 05/14/2019  Future Office visit:    Next 5 appointments (look out 90 days)    Aug 28, 2019  3:00 PM CDT  (Arrive by 2:45 PM)  PHYSICAL with Lorena Blake NP  [facility] ([facility] ) [address]  [facility] [address]  [phone]           Routing refill request to provider for review/approval because:

## 2019-08-22 RX ORDER — ATENOLOL 25 MG/1
TABLET ORAL
Qty: 30 TABLET | Refills: 0 | Status: SHIPPED | OUTPATIENT
Start: 2019-08-22 | End: 2019-08-28

## 2019-08-27 NOTE — PATIENT INSTRUCTIONS
ASSESSMENT/PLAN:     1. Routine general medical examination at a health care facility  - Annual physical 1 year    2. Essential hypertension, benign  - amLODIPine (NORVASC) 10 MG tablet; Take 1 tablet (10 mg) by mouth daily  Dispense: 90 tablet; Refill: 1  - atenolol (TENORMIN) 25 MG tablet; Take 1 tablet (25 mg) by mouth daily  Dispense: 90 tablet; Refill: 1  - lisinopril (PRINIVIL/ZESTRIL) 10 MG tablet; Take 1 tablet (10 mg) by mouth daily  Dispense: 90 tablet; Refill: 1  - Lipid Profile; Future  - Comprehensive metabolic panel (BMP + Alb, Alk Phos, ALT, AST, Total. Bili, TP); Future  - TSH with free T4 reflex; Future    3. Gastroesophageal reflux disease without esophagitis  - GENERAL SURG ADULT REFERRAL    4. Panic disorder  - Ativan is refilled        COUNSELING:  Reviewed preventive health counseling, as reflected in patient instructions       Regular exercise       Healthy diet/nutrition    Estimated body mass index is 34.99 kg/m  as calculated from the following:    Height as of this encounter: 1.829 m (6').    Weight as of this encounter: 117 kg (258 lb).         reports that he has been smoking cigarettes.  He has a 3.00 pack-year smoking history. He has never used smokeless tobacco.  Tobacco Cessation Action Plan: Self help information given to patient    Counseling Resources:  ATP IV Guidelines  Pooled Cohorts Equation Calculator  FRAX Risk Assessment  ICSI Preventive Guidelines  Dietary Guidelines for Americans, 2010  USDA's MyPlate  ASA Prophylaxis  Lung CA Screening      Lorena Blake NP  [facility]          Preventive Health Recommendations  Male Ages 40 to 49    Yearly exam:             See your health care provider every year in order to  o   Review health changes.   o   Discuss preventive care.    o   Review your medicines if your doctor has prescribed any.    You should be tested each year for STDs (sexually transmitted diseases) if you re at risk.     Have a cholesterol  test every 5 years.     Have a colonoscopy (test for colon cancer) if someone in your family has had colon cancer or polyps before age 50.     After age 45, have a diabetes test (fasting glucose). If you are at risk for diabetes, you should have this test every 3 years.      Talk with your health care provider about whether or not a prostate cancer screening test (PSA) is right for you.    Shots: Get a flu shot each year. Get a tetanus shot every 10 years.     Nutrition:    Eat at least 5 servings of fruits and vegetables daily.     Eat whole-grain bread, whole-wheat pasta and brown rice instead of white grains and rice.     Get adequate Calcium and Vitamin D.     Lifestyle    Exercise for at least 150 minutes a week (30 minutes a day, 5 days a week). This will help you control your weight and prevent disease.     Limit alcohol to one drink per day.     No smoking.     Wear sunscreen to prevent skin cancer.     See your dentist every six months for an exam and cleaning.

## 2019-08-27 NOTE — PROGRESS NOTES
SUBJECTIVE:   CC: Deepak Quevedo is an 43 year old male who presents for preventive health visit.         Healthy Habits:    Do you get at least three servings of calcium containing foods daily (dairy, green leafy vegetables, etc.)? yes    Amount of exercise or daily activities, outside of work: No    Problems taking medications regularly No    Medication side effects: No    Have you had an eye exam in the past two years? yes    Do you see a dentist twice per year? yes    Do you have sleep apnea, excessive snoring or daytime drowsiness?yes      Hypertension Follow-up    Do you check your blood pressure regularly outside of the clinic? No     Are you following a low salt diet? Yes    Are your blood pressures ever more than 140 on the top number (systolic) OR more   than 90 on the bottom number (diastolic), for example 140/90? NA      Anxiety - social -  Follow-Up    How are you doing with your depression since your last visit? No change    How are you doing with your anxiety since your last visit?  No change    Are you having other symptoms that might be associated with depression or anxiety? No    Have you had a significant life event? No     Do you have any concerns with your use of alcohol or other drugs? No       GERD  - patient reports omprazole is not working well      Social History     Tobacco Use     Smoking status: Current Some Day Smoker     Packs/day: 0.20     Years: 15.00     Pack years: 3.00     Types: Cigarettes     Smokeless tobacco: Never Used     Tobacco comment: Longest period tobacco-free: 6 months; relapse due to social pressure; passive smoke exposure (yes)   Substance Use Topics     Alcohol use: Yes     Comment: 3 beers daily/occasionally (?)     Drug use: No     PHQ 11/23/2016 5/14/2018 8/28/2019   PHQ-9 Total Score 2 4 2   Q9: Thoughts of better off dead/self-harm past 2 weeks Not at all Not at all Not at all     EMELINA-7 SCORE 11/23/2016 5/14/2018 8/28/2019   Total Score 4 2 0       Suicide  Assessment Five-step Evaluation and Treatment (SAFE-T)      Abuse: Current or Past(Physical, Sexual or Emotional)- No  Do you feel safe in your environment? Yes    Social History     Tobacco Use     Smoking status: Current Some Day Smoker     Packs/day: 0.20     Years: 15.00     Pack years: 3.00     Types: Cigarettes     Smokeless tobacco: Never Used     Tobacco comment: Longest period tobacco-free: 6 months; relapse due to social pressure; passive smoke exposure (yes)   Substance Use Topics     Alcohol use: Yes     Comment: 3 beers daily/occasionally (?)     If you drink alcohol do you typically have >3 drinks per day or >7 drinks per week? No                      Last PSA: No results found for: PSA    Reviewed orders with patient. Reviewed health maintenance and updated orders accordingly - Yes  Lab work is in process  Labs reviewed in EPIC  BP Readings from Last 3 Encounters:   08/28/19 132/78   05/14/18 100/70   11/23/16 108/66    Wt Readings from Last 3 Encounters:   08/28/19 117 kg (258 lb)   05/14/18 108 kg (238 lb)   11/23/16 109.3 kg (241 lb)                  Patient Active Problem List   Diagnosis     Essential hypertension, benign     GERD (gastroesophageal reflux disease)     Allergic rhinitis     Tobacco abuse     Panic disorder     Past Surgical History:   Procedure Laterality Date     CIRCUMCISION       ESOPHAGOSCOPY, GASTROSCOPY, DUODENOSCOPY (EGD), COMBINED  4/22/2013    Procedure: COMBINED ESOPHAGOSCOPY, GASTROSCOPY, DUODENOSCOPY (EGD);  UPPER ENDOSCOPY with Biopsy;  Surgeon: Ashley Narayanan DO;  Location: [facility]     TONSILLECTOMY         Social History     Tobacco Use     Smoking status: Current Some Day Smoker     Packs/day: 0.20     Years: 15.00     Pack years: 3.00     Types: Cigarettes     Smokeless tobacco: Never Used     Tobacco comment: Longest period tobacco-free: 6 months; relapse due to social pressure; passive smoke exposure (yes)   Substance Use Topics     Alcohol use: Yes      Comment: 3 beers daily/occasionally (?)     Family History   Problem Relation Age of Onset     Cancer Maternal Grandmother         Cause of death     Diabetes Maternal Grandmother 67     Diabetes Maternal Grandfather 70     Diabetes Father          Current Outpatient Medications   Medication Sig Dispense Refill     amLODIPine (NORVASC) 10 MG tablet TAKE 1 TABLET DAILY 20 tablet 0     aspirin 81 MG EC tablet Take 1 tablet (81 mg) by mouth daily 90 tablet 3     atenolol (TENORMIN) 25 MG tablet TAKE 1 TABLET DAILY 30 tablet 0     ibuprofen (ADVIL/MOTRIN) 800 MG tablet Take 1 tablet (800 mg) by mouth every 8 hours as needed for moderate pain 90 tablet 1     lisinopril (PRINIVIL/ZESTRIL) 10 MG tablet TAKE 1 TABLET DAILY 30 tablet 0     LORazepam (ATIVAN) 1 MG tablet TAKE 1 TABLET BY MOUTH EVERY DAY AS NEEDED FOR ANXIETY 30 tablet 0     montelukast (SINGULAIR) 10 MG tablet TAKE 1 TABLET AT BEDTIME 30 tablet 0     omeprazole (PRILOSEC) 40 MG DR capsule TAKE 1 CAPSULE DAILY 30 TO 60 MINUTES BEFORE A MEAL 30 capsule 0     No Known Allergies  Recent Labs   Lab Test 05/30/18  1333 05/14/18  1031 11/23/16  1418 06/04/14  1456 12/12/13  1415   LDL  --  95  --  66 101   HDL  --  32*  --  58 47   TRIG  --  100  --  151* 157*   ALT 89* 242*  --   --   --    CR  --  0.80 0.92 1.03 1.01   GFRESTIMATED  --  >90 >90  Non  GFR Calc   82 84   GFRESTBLACK  --  >90 >90   GFR Calc   >90 >90   POTASSIUM  --  3.8 3.7 4.6 4.9   TSH  --  1.25 1.00 0.59 0.67        Reviewed and updated as needed this visit by clinical staff  Tobacco  Meds         Reviewed and updated as needed this visit by Provider        Past Medical History:   Diagnosis Date     Allergic rhinitis 06/07/2011     Essential hypertension, benign 10/27/2010     GERD (gastroesophageal reflux disease) 06/07/2011     Obesity 06/07/2011     Panic disorder 06/07/2011     Tobacco abuse 11/16/2012      Past Surgical History:   Procedure Laterality Date      CIRCUMCISION       ESOPHAGOSCOPY, GASTROSCOPY, DUODENOSCOPY (EGD), COMBINED  4/22/2013    Procedure: COMBINED ESOPHAGOSCOPY, GASTROSCOPY, DUODENOSCOPY (EGD);  UPPER ENDOSCOPY with Biopsy;  Surgeon: Ashley Narayanan DO;  Location: [facility]     TONSILLECTOMY         ROS:  CONSTITUTIONAL: NEGATIVE for fever, chills, change in weight  INTEGUMENTARY/SKIN: NEGATIVE for worrisome rashes, moles or lesions  EYES: NEGATIVE for vision changes or irritation  ENT: NEGATIVE for ear, mouth and throat problems  RESP: NEGATIVE for significant cough or SOB  CV: NEGATIVE for chest pain, palpitations or peripheral edema   male: negative for dysuria, hematuria, decreased urinary stream, erectile dysfunction, urethral discharge  MUSCULOSKELETAL: NEGATIVE for significant arthralgias or myalgia  NEURO: NEGATIVE for weakness, dizziness or paresthesias  PSYCHIATRIC: NEGATIVE for changes in mood or affect    OBJECTIVE:   /78 (BP Location: Left arm, Patient Position: Sitting, Cuff Size: Adult Regular)   Pulse 88   Temp 97.1  F (36.2  C) (Tympanic)   Resp 14   Ht 1.829 m (6')   Wt 117 kg (258 lb)   SpO2 96%   BMI 34.99 kg/m         EXAM:  GENERAL: healthy, alert and no distress  EYES: Eyes grossly normal to inspection, PERRL and conjunctivae and sclerae normal  HENT: ear canals and TM's normal, nose and mouth without ulcers or lesions  NECK: no adenopathy, no asymmetry, masses, or scars and thyroid normal to palpation  RESP: lungs clear to auscultation - no rales, rhonchi or wheezes  CV: regular rate and rhythm, normal S1 S2, no S3 or S4, no murmur, click or rub, no peripheral edema and peripheral pulses strong  ABDOMEN: epigastric tenderness  MS: no gross musculoskeletal defects noted, no edema  SKIN: no suspicious lesions or rashes  PSYCH: mentation appears normal, affect normal/bright      ASSESSMENT/PLAN:     1. Routine general medical examination at a health care facility  - Annual physical 1 year    2. Essential  hypertension, benign  - amLODIPine (NORVASC) 10 MG tablet; Take 1 tablet (10 mg) by mouth daily  Dispense: 90 tablet; Refill: 1  - atenolol (TENORMIN) 25 MG tablet; Take 1 tablet (25 mg) by mouth daily  Dispense: 90 tablet; Refill: 1  - lisinopril (PRINIVIL/ZESTRIL) 10 MG tablet; Take 1 tablet (10 mg) by mouth daily  Dispense: 90 tablet; Refill: 1  - Lipid Profile; Future  - Comprehensive metabolic panel (BMP + Alb, Alk Phos, ALT, AST, Total. Bili, TP); Future  - TSH with free T4 reflex; Future    3. Gastroesophageal reflux disease without esophagitis  - GENERAL SURG ADULT REFERRAL    4. Panic disorder  - Ativan is refilled        COUNSELING:  Reviewed preventive health counseling, as reflected in patient instructions       Regular exercise       Healthy diet/nutrition    Estimated body mass index is 34.99 kg/m  as calculated from the following:    Height as of this encounter: 1.829 m (6').    Weight as of this encounter: 117 kg (258 lb).         reports that he has been smoking cigarettes.  He has a 3.00 pack-year smoking history. He has never used smokeless tobacco.  Tobacco Cessation Action Plan: Self help information given to patient        Counseling Resources:  ATP IV Guidelines  Pooled Cohorts Equation Calculator  FRAX Risk Assessment  ICSI Preventive Guidelines  Dietary Guidelines for Americans, 2010  USDA's MyPlate  ASA Prophylaxis  Lung CA Screening      Lorena Blake NP  [facility]

## 2019-08-28 ENCOUNTER — OFFICE VISIT (OUTPATIENT)
Dept: FAMILY MEDICINE | Facility: OTHER | Age: 44
End: 2019-08-28
Attending: NURSE PRACTITIONER
Payer: COMMERCIAL

## 2019-08-28 VITALS
BODY MASS INDEX: 34.95 KG/M2 | TEMPERATURE: 97.1 F | RESPIRATION RATE: 14 BRPM | HEIGHT: 72 IN | WEIGHT: 258 LBS | OXYGEN SATURATION: 96 % | SYSTOLIC BLOOD PRESSURE: 132 MMHG | HEART RATE: 88 BPM | DIASTOLIC BLOOD PRESSURE: 78 MMHG

## 2019-08-28 DIAGNOSIS — F41.0 PANIC DISORDER WITHOUT AGORAPHOBIA: ICD-10-CM

## 2019-08-28 DIAGNOSIS — I10 ESSENTIAL HYPERTENSION, BENIGN: ICD-10-CM

## 2019-08-28 DIAGNOSIS — Z00.00 ROUTINE GENERAL MEDICAL EXAMINATION AT A HEALTH CARE FACILITY: Primary | ICD-10-CM

## 2019-08-28 DIAGNOSIS — K21.9 GASTROESOPHAGEAL REFLUX DISEASE WITHOUT ESOPHAGITIS: ICD-10-CM

## 2019-08-28 PROCEDURE — 99396 PREV VISIT EST AGE 40-64: CPT | Performed by: NURSE PRACTITIONER

## 2019-08-28 RX ORDER — LORAZEPAM 1 MG/1
TABLET ORAL
Qty: 30 TABLET | Refills: 1 | Status: SHIPPED | OUTPATIENT
Start: 2019-08-28 | End: 2020-01-14

## 2019-08-28 RX ORDER — LISINOPRIL 10 MG/1
10 TABLET ORAL DAILY
Qty: 90 TABLET | Refills: 1 | Status: SHIPPED | OUTPATIENT
Start: 2019-08-28 | End: 2020-02-04

## 2019-08-28 RX ORDER — AMLODIPINE BESYLATE 10 MG/1
10 TABLET ORAL DAILY
Qty: 90 TABLET | Refills: 1 | Status: SHIPPED | OUTPATIENT
Start: 2019-08-28 | End: 2020-02-04

## 2019-08-28 RX ORDER — ATENOLOL 25 MG/1
25 TABLET ORAL DAILY
Qty: 90 TABLET | Refills: 1 | Status: SHIPPED | OUTPATIENT
Start: 2019-08-28 | End: 2020-02-25

## 2019-08-28 ASSESSMENT — ANXIETY QUESTIONNAIRES
1. FEELING NERVOUS, ANXIOUS, OR ON EDGE: NOT AT ALL
GAD7 TOTAL SCORE: 0
6. BECOMING EASILY ANNOYED OR IRRITABLE: NOT AT ALL
7. FEELING AFRAID AS IF SOMETHING AWFUL MIGHT HAPPEN: NOT AT ALL
2. NOT BEING ABLE TO STOP OR CONTROL WORRYING: NOT AT ALL
3. WORRYING TOO MUCH ABOUT DIFFERENT THINGS: NOT AT ALL
5. BEING SO RESTLESS THAT IT IS HARD TO SIT STILL: NOT AT ALL

## 2019-08-28 ASSESSMENT — PATIENT HEALTH QUESTIONNAIRE - PHQ9
5. POOR APPETITE OR OVEREATING: NOT AT ALL
SUM OF ALL RESPONSES TO PHQ QUESTIONS 1-9: 2

## 2019-08-28 ASSESSMENT — MIFFLIN-ST. JEOR: SCORE: 2113.28

## 2019-08-28 ASSESSMENT — PAIN SCALES - GENERAL: PAINLEVEL: NO PAIN (0)

## 2019-08-28 NOTE — NURSING NOTE
Chief Complaint   Patient presents with     Physical     Anxiety     Depression     Hypertension     Gastrophageal Reflux       Initial /78 (BP Location: Left arm, Patient Position: Sitting, Cuff Size: Adult Regular)   Pulse 88   Temp 97.1  F (36.2  C) (Tympanic)   Resp 14   Ht 1.829 m (6')   Wt 117 kg (258 lb)   SpO2 96%   BMI 34.99 kg/m   Estimated body mass index is 34.99 kg/m  as calculated from the following:    Height as of this encounter: 1.829 m (6').    Weight as of this encounter: 117 kg (258 lb).  Medication Reconciliation: complete   Ondina Arreaga LPN

## 2019-08-29 ASSESSMENT — ANXIETY QUESTIONNAIRES: GAD7 TOTAL SCORE: 0

## 2019-08-30 DIAGNOSIS — I10 ESSENTIAL HYPERTENSION, BENIGN: ICD-10-CM

## 2019-08-30 LAB
ALBUMIN SERPL-MCNC: 4 G/DL (ref 3.4–5)
ALP SERPL-CCNC: 69 U/L (ref 40–150)
ALT SERPL W P-5'-P-CCNC: 171 U/L (ref 0–70)
ANION GAP SERPL CALCULATED.3IONS-SCNC: 11 MMOL/L (ref 3–14)
AST SERPL W P-5'-P-CCNC: 118 U/L (ref 0–45)
BILIRUB SERPL-MCNC: 0.5 MG/DL (ref 0.2–1.3)
BUN SERPL-MCNC: 11 MG/DL (ref 7–30)
CALCIUM SERPL-MCNC: 9.1 MG/DL (ref 8.5–10.1)
CHLORIDE SERPL-SCNC: 106 MMOL/L (ref 94–109)
CHOLEST SERPL-MCNC: 158 MG/DL
CO2 SERPL-SCNC: 23 MMOL/L (ref 20–32)
CREAT SERPL-MCNC: 0.76 MG/DL (ref 0.66–1.25)
GFR SERPL CREATININE-BSD FRML MDRD: >90 ML/MIN/{1.73_M2}
GLUCOSE SERPL-MCNC: 99 MG/DL (ref 70–99)
HDLC SERPL-MCNC: 40 MG/DL
LDLC SERPL CALC-MCNC: 95 MG/DL
NONHDLC SERPL-MCNC: 118 MG/DL
POTASSIUM SERPL-SCNC: 3.6 MMOL/L (ref 3.4–5.3)
PROT SERPL-MCNC: 8.6 G/DL (ref 6.8–8.8)
SODIUM SERPL-SCNC: 140 MMOL/L (ref 133–144)
TRIGL SERPL-MCNC: 116 MG/DL
TSH SERPL DL<=0.005 MIU/L-ACNC: 2.36 MU/L (ref 0.4–4)

## 2019-08-30 PROCEDURE — 80061 LIPID PANEL: CPT | Performed by: NURSE PRACTITIONER

## 2019-08-30 PROCEDURE — 84443 ASSAY THYROID STIM HORMONE: CPT | Performed by: NURSE PRACTITIONER

## 2019-08-30 PROCEDURE — 36415 COLL VENOUS BLD VENIPUNCTURE: CPT | Performed by: NURSE PRACTITIONER

## 2019-08-30 PROCEDURE — 80053 COMPREHEN METABOLIC PANEL: CPT | Performed by: NURSE PRACTITIONER

## 2019-08-30 NOTE — RESULT ENCOUNTER NOTE
LFT elevation noted -   Avoid alcohol, avoid tylenol  Follow-up labs - repeat 4 weeks    [facility]  [phone]

## 2019-09-06 DIAGNOSIS — K21.00 GASTROESOPHAGEAL REFLUX DISEASE WITH ESOPHAGITIS: ICD-10-CM

## 2019-09-06 DIAGNOSIS — S39.012A STRAIN OF LUMBAR REGION, INITIAL ENCOUNTER: ICD-10-CM

## 2019-09-06 NOTE — TELEPHONE ENCOUNTER
omeprazole (PRILOSEC) 40 MG DR capsule  Last visit date with prescribing provider: 8-  Last refill date: 7-  Quantity: 30, Refills:     ibuprofen (ADVIL/MOTRIN) 800 MG tablet  Last visit date with prescribing provider: 8-  Last refill date: 5-  Quantity: 90, Refills: 1    Sanjuana López LPN

## 2019-09-11 ENCOUNTER — OFFICE VISIT (OUTPATIENT)
Dept: SURGERY | Facility: OTHER | Age: 44
End: 2019-09-11
Attending: SURGERY
Payer: COMMERCIAL

## 2019-09-11 VITALS
RESPIRATION RATE: 16 BRPM | WEIGHT: 250 LBS | HEIGHT: 71 IN | BODY MASS INDEX: 35 KG/M2 | HEART RATE: 84 BPM | TEMPERATURE: 98.5 F | SYSTOLIC BLOOD PRESSURE: 120 MMHG | OXYGEN SATURATION: 97 % | DIASTOLIC BLOOD PRESSURE: 78 MMHG

## 2019-09-11 DIAGNOSIS — K21.9 GASTROESOPHAGEAL REFLUX DISEASE, ESOPHAGITIS PRESENCE NOT SPECIFIED: ICD-10-CM

## 2019-09-11 DIAGNOSIS — I10 ESSENTIAL HYPERTENSION, BENIGN: Primary | ICD-10-CM

## 2019-09-11 PROCEDURE — 99243 OFF/OP CNSLTJ NEW/EST LOW 30: CPT | Mod: 25 | Performed by: SURGERY

## 2019-09-11 PROCEDURE — 93000 ELECTROCARDIOGRAM COMPLETE: CPT | Performed by: INTERNAL MEDICINE

## 2019-09-11 RX ORDER — OMEPRAZOLE 40 MG/1
40 CAPSULE, DELAYED RELEASE ORAL 2 TIMES DAILY
Qty: 180 CAPSULE | Refills: 3 | Status: SHIPPED | OUTPATIENT
Start: 2019-09-11 | End: 2020-10-01

## 2019-09-11 RX ORDER — IBUPROFEN 800 MG/1
800 TABLET, FILM COATED ORAL EVERY 8 HOURS PRN
Qty: 90 TABLET | Refills: 1 | Status: SHIPPED | OUTPATIENT
Start: 2019-09-11 | End: 2019-11-22

## 2019-09-11 RX ORDER — OMEPRAZOLE 40 MG/1
CAPSULE, DELAYED RELEASE ORAL
Qty: 90 CAPSULE | Refills: 1 | Status: SHIPPED | OUTPATIENT
Start: 2019-09-11 | End: 2021-01-05

## 2019-09-11 ASSESSMENT — MIFFLIN-ST. JEOR: SCORE: 2061.12

## 2019-09-11 ASSESSMENT — PAIN SCALES - GENERAL: PAINLEVEL: NO PAIN (0)

## 2019-09-11 NOTE — PATIENT INSTRUCTIONS
"Thank you for allowing Dr. Rodriguez and our surgical team to participate in your care.  If you have a scheduling or an appointment question please contact Yolande our Health Unit Coordinator at her direct line [phone].   ALL nursing questions or concerns can be directed to your surgical nurse at: [phone]      Thank you for allowing our surgical team to participate in your care. Please review the following instructions to prepare for your upcoming Upper Endoscopy. You may call any of the numbers listed below with questions you may have.  [facility] Health Unit Coordinator: [phone]  Clinic Surgery Nurse: [phone]  Surgery Education Nurse: [phone]    Date of Procedure: 10/3/19 with Dr. Rodriguez  Admit time: Surgery  will call you the day before your procedure by 5pm with your admit time. If your surgery is on Monday, please expect a call on Friday. If we were unable to reach you by 5PM, you may call  [phone] for your arrival time.     test(s) needed EKG.    Please call the Surgery Education Nurses 1-2 weeks prior to your surgery date at  [phone] for further instructions. Please have your medication/allergy lists ready.    Do not take Aspirin (325mg), other NSAIDs (Ibuprofen, Motrin, Aleve, Celebrex, Naproxen, etc...) vitamins or supplements 7 days before your surgery. If you are on blood thinners or insulin, please call your primary care provider for instruction.     Please call the clinic surgery nurse or your regular doctor if you become ill within 1-2 weeks of the procedure. (vomiting, diarrhea, fever, cough, cold or any other symptoms of illness)    Do not eat any solid foods or milk products after 10pm the night prior to surgery. You may have clear liquids only up until 4 hours prior to surgery. Please see the list of liquids you may have and you can not have on page 2 of the separate \"Instructions for Your Upper Endoscopy\" packet.     You will need a responsible adult available to drive " you home and stay with you for at least 4 hours after you leave the hospital. You will not be allowed to drive yourself. If you need to take a taxi or the bus you must have a responsible person to ride with you (not the taxi/). Your procedure will be cancelled if you do not bring a responsible adult.    Questions or concerns can be directed to the clinic or surgery education nurse at any of the numbers listed above. If you have a scheduling or appointment question, please call the Health Unit Coordinator between 8am and 4pm Monday through Friday. After hours or on weekends, please call [phone] to postpone.

## 2019-09-11 NOTE — PROGRESS NOTES
CLINIC NOTE - CONSULT  9/11/2019    Patient : Deepak Quevedo  Referring Physician : Lorena Blake    Reason for Referral : GERD    This is a 43 year old male with a history of GERD.  Patient is in need of an EGD.      Last EGD : years  History of GERD : YES  History of PUD : NO  On Acid supression : YES   Drug and Dose : Prilosec 40 qd   Symptoms well controled on current therapy:  NO  History of dysphagia : YES   Dysphagia to solids greater than liquids : YES  Hematemesis : NO  Melena : NO  History of dental caries: YES  Brackish breath: YES  Chest Pain : NO  Symptoms better with postural changes: YES    Past Medical History:  Past Medical History:   Diagnosis Date     Allergic rhinitis 06/07/2011     Essential hypertension, benign 10/27/2010     GERD (gastroesophageal reflux disease) 06/07/2011     Obesity 06/07/2011     Panic disorder 06/07/2011     Tobacco abuse 11/16/2012       Past Surgical History:  Past Surgical History:   Procedure Laterality Date     CIRCUMCISION       ESOPHAGOSCOPY, GASTROSCOPY, DUODENOSCOPY (EGD), COMBINED  4/22/2013    Procedure: COMBINED ESOPHAGOSCOPY, GASTROSCOPY, DUODENOSCOPY (EGD);  UPPER ENDOSCOPY with Biopsy;  Surgeon: Ashley Narayanan DO;  Location: [facility]     TONSILLECTOMY         Family History History:  Family History   Problem Relation Age of Onset     Cancer Maternal Grandmother         Cause of death     Diabetes Maternal Grandmother 67     Diabetes Maternal Grandfather 70     Diabetes Father        History of Tobacco Use:  History   Smoking Status     Current Some Day Smoker     Packs/day: 0.20     Years: 15.00     Types: Cigarettes   Smokeless Tobacco     Never Used     Comment: Longest period tobacco-free: 6 months; relapse due to social pressure; passive smoke exposure (yes)       Current Medications:  Current Outpatient Medications   Medication Sig Dispense Refill     amLODIPine (NORVASC) 10 MG tablet Take 1 tablet (10 mg) by mouth daily 90 tablet 1     aspirin 81 MG  "EC tablet Take 1 tablet (81 mg) by mouth daily 90 tablet 3     atenolol (TENORMIN) 25 MG tablet Take 1 tablet (25 mg) by mouth daily 90 tablet 1     ibuprofen (ADVIL/MOTRIN) 800 MG tablet Take 1 tablet (800 mg) by mouth every 8 hours as needed for moderate pain 90 tablet 1     lisinopril (PRINIVIL/ZESTRIL) 10 MG tablet Take 1 tablet (10 mg) by mouth daily 90 tablet 1     LORazepam (ATIVAN) 1 MG tablet TAKE 1 TABLET BY MOUTH EVERY DAY AS NEEDED FOR ANXIETY 30 tablet 1     montelukast (SINGULAIR) 10 MG tablet TAKE 1 TABLET AT BEDTIME 30 tablet 0     omeprazole (PRILOSEC) 40 MG DR capsule TAKE 1 CAPSULE DAILY 30 TO 60 MINUTES BEFORE A MEAL 90 capsule 1       Allergies:  No Known Allergies    ROS:  Pertinent items are noted in HPI.  All other systems are negative.    PHYSICAL EXAM:     Vital signs: /78 (BP Location: Right arm, Cuff Size: Adult Large)   Pulse 84   Temp 98.5  F (36.9  C) (Tympanic)   Resp 16   Ht 1.803 m (5' 11\")   Wt 113.4 kg (250 lb)   SpO2 97%   BMI 34.87 kg/m     Weight: [unfilled]   BMI: Body mass index is 34.87 kg/m .   General: Normal, healthy, cooperative, in no acute distress   Skin: no jaundice   HEENT: PERRLA, EOMI and Sclera clear, anicteric   Neck: supple   Lungs: clear to auscultation   CV: Regular rate and rhythm without murmer   Abdominal: abdomen is soft without significant tenderness, masses, organomegaly or guarding   Extremities: No cyanosis, clubbing or edema noted bilaterally in Upper and Lower Extremities   Neurological: without deficit    Assessment:   43 year old male with a history of GERD.  The patient is not controled on current medications.  Patient is in need of an upper endoscopy to assess.  Patient does desire work-up for possible Nissen.    Plan:   Patient will be increased on his dose of PPI to 40mg BID.   Patient does need a barium swallow.   Will get an U/S to R/O cholelithiasis.   Will get a gastric emptying study   Will get esophageal manometry       Will " schedule an esophagogastroduodenoscopy.  The procedures with their risks, benefits and alternatives were explained.  Risks include but are not limited to bleeding, perforation, missing lesions, need for additional procedures, reaction to anesthesia.  All the patients questions were answered.  The patient consents to proceed.  The procedure will be scheduled.

## 2019-09-11 NOTE — NURSING NOTE
"Chief Complaint   Patient presents with     Consult     referred by Lorena Blake for GERD symptoms       Initial /78 (BP Location: Right arm, Cuff Size: Adult Large)   Pulse 84   Temp 98.5  F (36.9  C) (Tympanic)   Resp 16   Ht 1.803 m (5' 11\")   Wt 113.4 kg (250 lb)   SpO2 97%   BMI 34.87 kg/m   Estimated body mass index is 34.87 kg/m  as calculated from the following:    Height as of this encounter: 1.803 m (5' 11\").    Weight as of this encounter: 113.4 kg (250 lb).  Medication Reconciliation: complete    "

## 2019-09-17 ENCOUNTER — TELEPHONE (OUTPATIENT)
Dept: NUCLEAR MEDICINE | Facility: HOSPITAL | Age: 44
End: 2019-09-17

## 2019-09-17 NOTE — TELEPHONE ENCOUNTER
Appointment Reminder Call    -Exam prep  - When and where to register  -Appointment length with waiting time - recommended cell or book

## 2019-09-18 ENCOUNTER — HOSPITAL ENCOUNTER (OUTPATIENT)
Dept: NUCLEAR MEDICINE | Facility: HOSPITAL | Age: 44
End: 2019-09-18
Attending: SURGERY
Payer: COMMERCIAL

## 2019-09-18 ENCOUNTER — HOSPITAL ENCOUNTER (OUTPATIENT)
Dept: NUCLEAR MEDICINE | Facility: HOSPITAL | Age: 44
Discharge: HOME | End: 2019-09-18
Attending: SURGERY | Admitting: SURGERY
Payer: COMMERCIAL

## 2019-09-18 DIAGNOSIS — K21.9 GASTROESOPHAGEAL REFLUX DISEASE, ESOPHAGITIS PRESENCE NOT SPECIFIED: ICD-10-CM

## 2019-09-18 PROCEDURE — 78264 GASTRIC EMPTYING IMG STUDY: CPT | Mod: TC

## 2019-09-18 PROCEDURE — A9541 TC99M SULFUR COLLOID: HCPCS | Performed by: RADIOLOGY

## 2019-09-18 PROCEDURE — 34300033 ZZH RX 343: Performed by: RADIOLOGY

## 2019-09-18 RX ADMIN — Medication 1 MILLICURIE: at 10:22

## 2019-09-19 ENCOUNTER — HOSPITAL ENCOUNTER (OUTPATIENT)
Dept: GENERAL RADIOLOGY | Facility: HOSPITAL | Age: 44
End: 2019-09-19
Attending: SURGERY
Payer: COMMERCIAL

## 2019-09-19 ENCOUNTER — HOSPITAL ENCOUNTER (OUTPATIENT)
Dept: ULTRASOUND IMAGING | Facility: HOSPITAL | Age: 44
Discharge: HOME | End: 2019-09-19
Attending: SURGERY | Admitting: SURGERY
Payer: COMMERCIAL

## 2019-09-19 DIAGNOSIS — K21.9 GASTROESOPHAGEAL REFLUX DISEASE, ESOPHAGITIS PRESENCE NOT SPECIFIED: ICD-10-CM

## 2019-09-19 PROCEDURE — 76705 ECHO EXAM OF ABDOMEN: CPT | Mod: TC

## 2019-09-19 PROCEDURE — 25500045 ZZH RX 255: Performed by: RADIOLOGY

## 2019-09-19 PROCEDURE — 74220 X-RAY XM ESOPHAGUS 1CNTRST: CPT | Mod: TC

## 2019-09-19 RX ADMIN — ANTACID/ANTIFLATULENT 4 G: 380; 550; 10; 10 GRANULE, EFFERVESCENT ORAL at 09:23

## 2019-09-30 ENCOUNTER — ANESTHESIA EVENT (OUTPATIENT)
Dept: SURGERY | Facility: HOSPITAL | Age: 44
End: 2019-09-30
Payer: COMMERCIAL

## 2019-09-30 ASSESSMENT — LIFESTYLE VARIABLES: TOBACCO_USE: 1

## 2019-09-30 NOTE — ANESTHESIA PREPROCEDURE EVALUATION
Anesthesia Pre-Procedure Evaluation    Patient: Deepak Quevedo   MRN: [id number] : 1975          Preoperative Diagnosis: GASTROESOPHAGEAL REFLUX DISEASE    Procedure(s):  UPPER ENDOSCOPY    Past Medical History:   Diagnosis Date     Allergic rhinitis 2011     Essential hypertension, benign 10/27/2010     GERD (gastroesophageal reflux disease) 2011     Obesity 2011     Panic disorder 2011     Tobacco abuse 2012     Past Surgical History:   Procedure Laterality Date     CIRCUMCISION       ESOPHAGOSCOPY, GASTROSCOPY, DUODENOSCOPY (EGD), COMBINED  2013    Procedure: COMBINED ESOPHAGOSCOPY, GASTROSCOPY, DUODENOSCOPY (EGD);  UPPER ENDOSCOPY with Biopsy;  Surgeon: Ashley Narayanan DO;  Location: [facility]     TONSILLECTOMY         Anesthesia Evaluation     . Pt has had prior anesthetic. Type: General and MAC    No history of anesthetic complications          ROS/MED HX    ENT/Pulmonary:     (+)DELMAR risk factors hypertension, obese, allergic rhinitis, tobacco use, Current use 0.25 packs/day  , . .    Neurologic:  - neg neurologic ROS     Cardiovascular:     (+) hypertension-range: took atenolol last yesterday morning, ---. : . . . :. . Previous cardiac testing date:results:date: results:ECG reviewed date:2019 results:NSR @66 date: results:          METS/Exercise Tolerance:  >4 METS   Hematologic:  - neg hematologic  ROS       Musculoskeletal:   (+) arthritis,  -       GI/Hepatic:     (+) GERD Symptomatic,       Renal/Genitourinary:  - ROS Renal section negative       Endo:     (+) Obesity, .      Psychiatric:     (+) psychiatric history other (comment) and anxiety (panic disorder)      Infectious Disease:  - neg infectious disease ROS       Malignancy:      - no malignancy   Other:    (+) no H/O Chronic Pain,  - neg other ROS                      Physical Exam  Normal systems: cardiovascular, pulmonary and dental    Airway   Mallampati: I  TM distance: >3 FB  Neck ROM:  "full    Dental     Cardiovascular   Rhythm and rate: regular and normal      Pulmonary    breath sounds clear to auscultation            Lab Results   Component Value Date    WBC 6.0 06/04/2014    HGB 17.7 06/04/2014    HCT 48.2 06/04/2014     06/04/2014     08/30/2019    POTASSIUM 3.6 08/30/2019    CHLORIDE 106 08/30/2019    CO2 23 08/30/2019    BUN 11 08/30/2019    CR 0.76 08/30/2019    GLC 99 08/30/2019    ANNI 9.1 08/30/2019    ALBUMIN 4.0 08/30/2019    PROTTOTAL 8.6 08/30/2019     (H) 08/30/2019     (H) 08/30/2019    ALKPHOS 69 08/30/2019    BILITOTAL 0.5 08/30/2019    TSH 2.36 08/30/2019       Preop Vitals  BP Readings from Last 3 Encounters:   09/11/19 120/78   08/28/19 132/78   05/14/18 100/70    Pulse Readings from Last 3 Encounters:   09/11/19 84   08/28/19 88   05/14/18 80      Resp Readings from Last 3 Encounters:   09/11/19 16   08/28/19 14   05/14/18 14    SpO2 Readings from Last 3 Encounters:   09/11/19 97%   08/28/19 96%   11/23/16 96%      Temp Readings from Last 1 Encounters:   09/11/19 98.5  F (36.9  C) (Tympanic)    Ht Readings from Last 1 Encounters:   09/11/19 1.803 m (5' 11\")      Wt Readings from Last 1 Encounters:   09/11/19 113.4 kg (250 lb)    Estimated body mass index is 34.87 kg/m  as calculated from the following:    Height as of 9/11/19: 1.803 m (5' 11\").    Weight as of 9/11/19: 113.4 kg (250 lb).       Anesthesia Plan      History & Physical Review  History and physical reviewed and following examination; no interval change.    ASA Status:  3 .    NPO Status:  > 8 hours    Plan for MAC with Intravenous and Propofol induction. Maintenance will be TIVA.  Reason for MAC:  Deep or markedly invasive procedure (G8), Extreme anxiety (QS), Chronic cardiopulmonary disease (G9) and Procedure to face, neck, head or breast  PONV prophylaxis:  Ondansetron (or other 5HT-3)  Consult note 9/11/19    Risks and benefits of MAC anesthetic discussed including dental damage, " aspiration, loss of airway, conversion to general anesthetic, CV complications, MI, stroke, death. Pt wishes to proceed.         Postoperative Care  Postoperative pain management:  IV analgesics.      Consents  Anesthetic plan, risks, benefits and alternatives discussed with:  Patient.  Use of blood products discussed: No .   .                 PJ Leon CRNA

## 2019-10-03 ENCOUNTER — ANESTHESIA (OUTPATIENT)
Dept: SURGERY | Facility: HOSPITAL | Age: 44
End: 2019-10-03
Payer: COMMERCIAL

## 2019-10-03 ENCOUNTER — HOSPITAL ENCOUNTER (OUTPATIENT)
Facility: HOSPITAL | Age: 44
Discharge: HOME | End: 2019-10-03
Attending: SURGERY | Admitting: SURGERY
Payer: COMMERCIAL

## 2019-10-03 VITALS
BODY MASS INDEX: 34.3 KG/M2 | WEIGHT: 245 LBS | OXYGEN SATURATION: 96 % | HEIGHT: 71 IN | SYSTOLIC BLOOD PRESSURE: 120 MMHG | DIASTOLIC BLOOD PRESSURE: 83 MMHG

## 2019-10-03 PROCEDURE — 37000008 ZZH ANESTHESIA TECHNICAL FEE, 1ST 30 MIN: Performed by: SURGERY

## 2019-10-03 PROCEDURE — 88305 TISSUE EXAM BY PATHOLOGIST: CPT | Mod: TC | Performed by: SURGERY

## 2019-10-03 PROCEDURE — 36000050 ZZH SURGERY LEVEL 2 1ST 30 MIN: Performed by: SURGERY

## 2019-10-03 PROCEDURE — 27210794 ZZH OR GENERAL SUPPLY STERILE: Performed by: SURGERY

## 2019-10-03 PROCEDURE — 25800030 ZZH RX IP 258 OP 636: Performed by: NURSE ANESTHETIST, CERTIFIED REGISTERED

## 2019-10-03 PROCEDURE — 40000306 ZZH STATISTIC PRE PROC ASSESS II: Performed by: SURGERY

## 2019-10-03 PROCEDURE — 43239 EGD BIOPSY SINGLE/MULTIPLE: CPT | Performed by: NURSE ANESTHETIST, CERTIFIED REGISTERED

## 2019-10-03 PROCEDURE — 71000027 ZZH RECOVERY PHASE 2 EACH 15 MINS: Performed by: SURGERY

## 2019-10-03 PROCEDURE — 43239 EGD BIOPSY SINGLE/MULTIPLE: CPT | Performed by: SURGERY

## 2019-10-03 PROCEDURE — 25000125 ZZHC RX 250: Performed by: NURSE ANESTHETIST, CERTIFIED REGISTERED

## 2019-10-03 PROCEDURE — 25000128 H RX IP 250 OP 636: Performed by: NURSE ANESTHETIST, CERTIFIED REGISTERED

## 2019-10-03 RX ORDER — ONDANSETRON 4 MG/1
4 TABLET, ORALLY DISINTEGRATING ORAL EVERY 30 MIN PRN
Status: DISCONTINUED | OUTPATIENT
Start: 2019-10-03 | End: 2019-10-03 | Stop reason: HOSPADM

## 2019-10-03 RX ORDER — LIDOCAINE HYDROCHLORIDE 20 MG/ML
INJECTION, SOLUTION INFILTRATION; PERINEURAL PRN
Status: DISCONTINUED | OUTPATIENT
Start: 2019-10-03 | End: 2019-10-03

## 2019-10-03 RX ORDER — NALOXONE HYDROCHLORIDE 0.4 MG/ML
.1-.4 INJECTION, SOLUTION INTRAMUSCULAR; INTRAVENOUS; SUBCUTANEOUS
Status: DISCONTINUED | OUTPATIENT
Start: 2019-10-03 | End: 2019-10-03 | Stop reason: HOSPADM

## 2019-10-03 RX ORDER — SODIUM CHLORIDE, SODIUM LACTATE, POTASSIUM CHLORIDE, CALCIUM CHLORIDE 600; 310; 30; 20 MG/100ML; MG/100ML; MG/100ML; MG/100ML
INJECTION, SOLUTION INTRAVENOUS CONTINUOUS
Status: DISCONTINUED | OUTPATIENT
Start: 2019-10-03 | End: 2019-10-03 | Stop reason: HOSPADM

## 2019-10-03 RX ORDER — MEPERIDINE HYDROCHLORIDE 50 MG/ML
12.5 INJECTION INTRAMUSCULAR; INTRAVENOUS; SUBCUTANEOUS
Status: DISCONTINUED | OUTPATIENT
Start: 2019-10-03 | End: 2019-10-03 | Stop reason: HOSPADM

## 2019-10-03 RX ORDER — ONDANSETRON 2 MG/ML
4 INJECTION INTRAMUSCULAR; INTRAVENOUS EVERY 30 MIN PRN
Status: DISCONTINUED | OUTPATIENT
Start: 2019-10-03 | End: 2019-10-03 | Stop reason: HOSPADM

## 2019-10-03 RX ORDER — LIDOCAINE 40 MG/G
CREAM TOPICAL
Status: DISCONTINUED | OUTPATIENT
Start: 2019-10-03 | End: 2019-10-03 | Stop reason: HOSPADM

## 2019-10-03 RX ORDER — PROPOFOL 10 MG/ML
INJECTION, EMULSION INTRAVENOUS PRN
Status: DISCONTINUED | OUTPATIENT
Start: 2019-10-03 | End: 2019-10-03

## 2019-10-03 RX ADMIN — PROPOFOL 50 MG: 10 INJECTION, EMULSION INTRAVENOUS at 07:50

## 2019-10-03 RX ADMIN — PROPOFOL 50 MG: 10 INJECTION, EMULSION INTRAVENOUS at 07:47

## 2019-10-03 RX ADMIN — LIDOCAINE HYDROCHLORIDE 40 MG: 20 INJECTION, SOLUTION INFILTRATION; PERINEURAL at 07:46

## 2019-10-03 RX ADMIN — PROPOFOL 80 MG: 10 INJECTION, EMULSION INTRAVENOUS at 07:46

## 2019-10-03 RX ADMIN — SODIUM CHLORIDE, POTASSIUM CHLORIDE, SODIUM LACTATE AND CALCIUM CHLORIDE: 600; 310; 30; 20 INJECTION, SOLUTION INTRAVENOUS at 07:15

## 2019-10-03 RX ADMIN — PROPOFOL 50 MG: 10 INJECTION, EMULSION INTRAVENOUS at 07:49

## 2019-10-03 RX ADMIN — PROPOFOL 50 MG: 10 INJECTION, EMULSION INTRAVENOUS at 07:48

## 2019-10-03 RX ADMIN — PROPOFOL 30 MG: 10 INJECTION, EMULSION INTRAVENOUS at 07:52

## 2019-10-03 ASSESSMENT — MIFFLIN-ST. JEOR: SCORE: 2038.44

## 2019-10-03 NOTE — ANESTHESIA POSTPROCEDURE EVALUATION
Patient: Deepak Quevedo    Procedure(s):  UPPER ENDOSCOPY WITH BIOPSIES    Diagnosis:GASTROESOPHAGEAL REFLUX DISEASE  Diagnosis Additional Information: No value filed.    Anesthesia Type:  MAC    Note:  Anesthesia Post Evaluation    Patient participation: Able to fully participate in evaluation  Level of consciousness: awake and alert  Pain management: adequate  Airway patency: patent  Cardiovascular status: acceptable  Respiratory status: acceptable  Hydration status: acceptable  PONV: none             Last vitals:  Vitals:    10/03/19 0805 10/03/19 0810 10/03/19 0815   BP: 116/68 122/73 120/83   SpO2: 95% 95% 96%         Electronically Signed By: PJ Hamm CRNA  October 3, 2019  9:07 AM

## 2019-10-03 NOTE — OP NOTE
REPORT OF OPERATION  DATE OF PROCEDURE: 10/3/2019    PATIENT: Deepak Quevedo    SURGERY PREFORMED: Esophagogastroduodenoscopy with biopsies     PREOPERATIVE DIAGNOSIS: Eunice    POSTOPERATIVE DIAGNOSIS:    Same   Short segment Wilson's esophagus   Squamous columnar junction at 45    SURGEON: Lionel Rodriguez MD    ASSISTANTS: None    ANESTHESIA: Monitored Anesthesia Care    COMPLICATIONS: None apparent    TRANSFUSIONS: None    TISSUE TO PATHOLOGY: Duodenal, Antral and Distal Esophageal    FINDINGS: Short segment Wilson's esophagus    INDICATIONS: This is a 43 year old male with a history of GERD who is in need of an Esophagogastroduodenoscopy.  The patient will be taken to the endoscopy suite for that procedure.    DESCRIPTIONS OF PROCEDURE IN DETAIL: After consent was obtained the patient was taken to the operative suite and kory in the left lateral decubitus position.  The patient was identified and the correct patient was confirmed. Monitored Anesthesia Care was given by anesthesia. A time out was preformed verifying the correct patient and the correct procedure.  The entire operative team was in agreement.  All necessary equipment and supplies were in the room.    The endoscope was inserted into the mouth and passed without difficulty to the third portion of the duodenum.  Duodenal biopsies were taken.  The endoscope was then withdrawn through the duodenum, the duodenal bulb and pyloric channel and no abnormalities were noted.  The endoscope was brought back into the stomach and antral biopsies were obtained.  The endoscope was then retroflexed and no lesions of the fundus body or antrum were seen.  The endoscope was straightened back out and brought into the distal esophagus and the squamocolumnar junction was identified at 45 cm. Short segment Wilson's esophagus was noted extending form 45 cm to 42 cm.  Four quadrant biopsies were taken.  The endoscope was slowly withdrawn through the remaining esophagus  no other abnormalities are seen,  The endoscope was withdrawn from the patient the patient was then taken to the recovery room in stable condition tolerated the procedure well.

## 2019-10-03 NOTE — ANESTHESIA CARE TRANSFER NOTE
Patient: Deepak Quevedo    Procedure(s):  UPPER ENDOSCOPY WITH BIOPSIES    Diagnosis: GASTROESOPHAGEAL REFLUX DISEASE  Diagnosis Additional Information: No value filed.    Anesthesia Type:   MAC     Note:  Airway :Nasal Cannula  Patient transferred to:Phase II  Handoff Report: Identifed the Patient, Identified the Reponsible Provider, Reviewed the pertinent medical history, Discussed the surgical course, Reviewed Intra-OP anesthesia mangement and issues during anesthesia, Set expectations for post-procedure period and Allowed opportunity for questions and acknowledgement of understanding      Vitals: (Last set prior to Anesthesia Care Transfer)    CRNA VITALS  10/3/2019 0728 - 10/3/2019 0758      10/3/2019             Resp Rate (observed):  (!) 2    Resp Rate (set):  8                Electronically Signed By: PJ Nolasco CRNA  October 3, 2019  7:58 AM

## 2019-10-03 NOTE — DISCHARGE INSTRUCTIONS
UPPER ENDOSCOPY    AFTER THE PROCEDURE    You will return to Same Day Surgery to rest for about an hour before you go home.    The doctor will talk with you and your family.    A family member/friend may visit with you.    You may burp up any air remaining in your stomach.    You may feel dizzy or light-headed from the medicine.    Your nurse will go over the discharge instructions with you and your caregiver and answer any of your questions.      You will be contacted the next day to check on how you are doing.    If biopsies were taken, you will be contacted with the results usually within 3 days.  BACK AT HOME    Rest for an hour or two after you get home.    When your throat is no longer numb and you have a gag reflex, take a few sips of cool water.  If you can swallow comfortably, you may start eating again.    You may have a mild sore throat for the rest of the day.    You will be contacted with results by the surgeons office within a week. If not contacted in one week, call the surgeons office.  WHAT TO WATCH FOR:  Problems rarely occur after the exam, but it is important to be aware of the early signs of a complication.  Call your doctor immediately if you have:    Difficulty swallowing or breathing    Unusual pain in your stomach or chest    Vomiting blood or dark material that looks like coffee grounds    Black or tarry stools    Temperature over 101.5 degrees    MORE QUESTIONS?  Please ask your doctor or nurse before the exam begins  or call your doctor at the clinic.    IF YOU MUST CANCEL YOUR PROCEDURE THE EVENING/NIGHT BEFORE, PLEASE CALL HOSPITAL ADMITTING -[phone] OR TOLL FREE [phone], [phone].    Phone Numbers:  [facility] - [phone] [phone]  [facility] - [phone]  Surgery Patient Education - [phone] or toll free [phone]    Post-Anesthesia Patient Instructions    IMMEDIATELY FOLLOWING SURGERY:  Do not drive or operate machinery for the first twenty  four hours after surgery.  Do not make any important decisions for twenty four hours after surgery or while taking narcotic pain medications or sedatives.  If you develop intractable nausea and vomiting or a severe headache please notify your doctor immediately.    FOLLOW-UP:  Please make an appointment with your surgeon as instructed. You do not need to follow up with anesthesia unless specifically instructed to do so.    WOUND CARE INSTRUCTIONS (if applicable):  Keep a dry clean dressing on the anesthesia/puncture wound site if there is drainage.  Once the wound has quit draining you may leave it open to air.  Generally you should leave the bandage intact for twenty four hours unless there is drainage.  If the epidural site drains for more than 36-48 hours please call the anesthesia department.    QUESTIONS?:  Please feel free to call your physician or the hospital  if you have any questions, and they will be happy to assist you.

## 2019-10-04 LAB — COPATH REPORT: NORMAL

## 2019-10-09 ENCOUNTER — OFFICE VISIT (OUTPATIENT)
Dept: SURGERY | Facility: OTHER | Age: 44
End: 2019-10-09
Attending: SURGERY
Payer: COMMERCIAL

## 2019-10-09 VITALS
HEART RATE: 88 BPM | HEIGHT: 71 IN | TEMPERATURE: 98.1 F | SYSTOLIC BLOOD PRESSURE: 122 MMHG | WEIGHT: 245 LBS | BODY MASS INDEX: 34.3 KG/M2 | OXYGEN SATURATION: 95 % | DIASTOLIC BLOOD PRESSURE: 72 MMHG

## 2019-10-09 DIAGNOSIS — K21.9 GASTROESOPHAGEAL REFLUX DISEASE WITHOUT ESOPHAGITIS: Primary | ICD-10-CM

## 2019-10-09 PROCEDURE — 99213 OFFICE O/P EST LOW 20 MIN: CPT | Performed by: SURGERY

## 2019-10-09 ASSESSMENT — PAIN SCALES - GENERAL: PAINLEVEL: NO PAIN (0)

## 2019-10-09 ASSESSMENT — MIFFLIN-ST. JEOR: SCORE: 2033.44

## 2019-10-09 NOTE — NURSING NOTE
"Chief Complaint   Patient presents with     Surgical Followup     EGD       Initial /72   Pulse 88   Temp 98.1  F (36.7  C) (Tympanic)   Ht 1.803 m (5' 11\")   Wt 111.1 kg (245 lb)   SpO2 95%   BMI 34.17 kg/m   Estimated body mass index is 34.17 kg/m  as calculated from the following:    Height as of this encounter: 1.803 m (5' 11\").    Weight as of this encounter: 111.1 kg (245 lb).  Medication Reconciliation: complete  Shavon Abel LPN  "

## 2019-10-09 NOTE — PROGRESS NOTES
CLINIC NOTE - POST-OP ENDOSCOPY  10/9/2019    Patient:Deepak Quevedo    Procedure: Esophagogastroduodenoscopy with biopsy    This is a 44 year old male who is 1 weeks s/p Esophagogastroduodenoscopy with biopsy.  At the time of endoscopy questionable Wilson's was noted.     Patient is also here to follow-up on a gastric emptying study, an ultrasound of his gallbladder and a barium swallow.    He has no complaints today.    Current Medications:  Current Outpatient Medications   Medication Sig Dispense Refill     amLODIPine (NORVASC) 10 MG tablet Take 1 tablet (10 mg) by mouth daily 90 tablet 1     aspirin 81 MG EC tablet Take 1 tablet (81 mg) by mouth daily 90 tablet 3     atenolol (TENORMIN) 25 MG tablet Take 1 tablet (25 mg) by mouth daily 90 tablet 1     ibuprofen (ADVIL/MOTRIN) 800 MG tablet Take 1 tablet (800 mg) by mouth every 8 hours as needed for moderate pain 90 tablet 1     lisinopril (PRINIVIL/ZESTRIL) 10 MG tablet Take 1 tablet (10 mg) by mouth daily 90 tablet 1     LORazepam (ATIVAN) 1 MG tablet TAKE 1 TABLET BY MOUTH EVERY DAY AS NEEDED FOR ANXIETY 30 tablet 1     montelukast (SINGULAIR) 10 MG tablet TAKE 1 TABLET AT BEDTIME 30 tablet 0     omeprazole (PRILOSEC) 40 MG DR capsule TAKE 1 CAPSULE DAILY 30 TO 60 MINUTES BEFORE A MEAL 90 capsule 1     omeprazole (PRILOSEC) 40 MG DR capsule Take 1 capsule (40 mg) by mouth 2 times daily 180 capsule 3     RADIOLOGY:    Results for orders placed during the hospital encounter of 09/19/19   US Abdomen Limited    Narrative PROCEDURE: US ABDOMEN LIMITED 9/19/2019 8:40 AM    HISTORY: Gastroesophageal reflux disease, esophagitis presence not  specified    COMPARISONS: None.    TECHNIQUE: Ultrasound of the right upper quadrant.    FINDINGS: No gallstones are seen. There is no wall thickening or  pericholecystic fluid. Patient is not tender over the gallbladder wall  scanning. Common hepatic duct measures 2 mm.    Liver measures 17.8 cm in length. No focal hepatic  "mass is seen.  Pancreas is completely obscured. No right renal lesion is seen.         Impression IMPRESSION: No gallstones.    ELIESER VALDEZ MD   ,   Results for orders placed during the hospital encounter of 09/19/19   XR Esophagram    Narrative Exam: XR ESOPHAGRAM    History: Gastroesophageal reflux disease. Esophagitis.    Technique: Double contrast esophagram.    Findings: Cervical esophagus has normal appearance.    Thoracic esophagus has normal course, caliber and motility. No hiatal  hernia or spontaneous gastroesophageal reflux is seen. There is no  fixed area of narrowing or focal filling defect.    Gastric fundus has a normal appearance.             Impression Impression: Negative esophagram.    ELIESER VALDEZ MD     Results for orders placed during the hospital encounter of 09/18/19   NM Gastric Emptying    Narrative PROCEDURE: NM GASTRIC EMPTYING 9/18/2019 2:42 PM    HISTORY: Gastroesophageal reflux disease, esophagitis presence not  specified    COMPARISONS: None.    TECHNIQUE: Gastric emptying time was calculated after ingestion of 4  ounces of egg mixed with 2 pieces of white toast and jelly containing  1.03 mCi technetium 99m sulfur colloid.    FINDINGS: Gastric emptying T 1/2  time is 115 minutes.    At 57 minutes there is 25% emptying, at 123 minutes 53% emptying, at  180 minutes 79% emptying and at 238 minutes 100% emptying.         Impression IMPRESSION: Gastric emptying T 1/2 time is 115 minutes.    ELIESER VALDEZ MD     Allergies:  No Known Allergies    PHYSICAL EXAM:   Vital signs: /72   Pulse 88   Temp 98.1  F (36.7  C) (Tympanic)   Ht 1.803 m (5' 11\")   Wt 111.1 kg (245 lb)   SpO2 95%   BMI 34.17 kg/m     Weight: [unfilled]   BMI: Body mass index is 34.17 kg/m .   General: Normal, healthy, cooperative, in no acute distress   Lungs: clear to auscultation   CV: Regular rate and rhythm without murmer   Abdominal: abdomen is soft without significant tenderness, masses, " organomegaly or guarding       PATHOLOGY:  Copath Report   Date Value Ref Range Status   10/03/2019   Final    Patient Name: DEVONTE STEVEN  MR#: [id number]  Specimen #: [id number]  Collected: 10/3/2019  Received: 10/3/2019  Reported: 10/4/2019 14:25  Ordering Phy(s): EB BERRIOS  Additional Phy(s): ELIESER WETZEL    For improved result formatting, select 'View Enhanced Report Format' under   Linked Documents section.    SPECIMEN(S):  A: Duodenal biopsy  B: Stomach biopsy, body  C: Gastroesophageal biopsy, junction    FINAL DIAGNOSIS:  A: Small bowel, duodenum, biopsy  - No pathologic diagnosis    B: Stomach, body, biopsy  - No pathologic diagnosis    C: Gastroesophageal junction, biopsy  - No pathologic diagnosis    I have personally reviewed all specimens and/or slides, including the   listed special stains, and used them  with my medical judgement to determine or confirm the final diagnosis.    Electronically signed out by:    Juan Benítez M.D.    CLINICAL HISTORY:  GERD; upper endoscopy with biopsies.    GROSS:  A: There is a 3 mm piece of tan soft tissue. (1 TE in 1 block).    B: There is a 3 mm piece of tan soft tissue. (1 TE in 1 block).    C: There are two pieces of tan soft tissue which are each 3 mm. (2 TE in 1   block). (Dictated by: Juan Benítez MD 10/3/2019 04:18 PM)    MICROSCOPIC:  A: There is unremarkable small bowel.    B: There is unremarkable gastric body.    C: There is squamous and gastric mucosa which is essentially unremarkable.    CPT Codes  A: 83467-CM3  B: 28237-KZ6  C: 31687-CH4    COLLECTION SITE:  Client: [facility]  Location: [facility] (B)    The technical component of this testing was completed at the [facility], with the  professional component performed at the [facility], [address] [place] [id number]  ([phone])           ASSESSMENT:    44 year old male who is 1 weeks s/p Esophagogastroduodenoscopy  with biopsy.  At the time of endoscopy questionable short segment Wilson's was noted.    PLAN:   This is a 44-year-old with severe gastroesophageal reflux disease not well controlled on medical management.  He does have changes consistent with short segment Wilson's, even though pathology did not show that.  It would still be concerned with protecting his esophagus.    He is scheduled for a manometry of his esophagus.  He will follow-up after the manometry at that point we will make a decision on surgical intervention.  He is comfortable with that.  All of his questions were answered.  He will follow-up as arranged.

## 2019-11-05 DIAGNOSIS — J30.1 CHRONIC SEASONAL ALLERGIC RHINITIS DUE TO POLLEN: ICD-10-CM

## 2019-11-05 RX ORDER — MONTELUKAST SODIUM 10 MG/1
TABLET ORAL
Qty: 30 TABLET | Refills: 5 | Status: SHIPPED | OUTPATIENT
Start: 2019-11-05 | End: 2022-06-20

## 2019-11-19 ENCOUNTER — TRANSFERRED RECORDS (OUTPATIENT)
Dept: HEALTH INFORMATION MANAGEMENT | Facility: CLINIC | Age: 44
End: 2019-11-19

## 2019-11-20 ENCOUNTER — TRANSFERRED RECORDS (OUTPATIENT)
Dept: HEALTH INFORMATION MANAGEMENT | Facility: CLINIC | Age: 44
End: 2019-11-20

## 2019-11-22 DIAGNOSIS — S39.012A STRAIN OF LUMBAR REGION, INITIAL ENCOUNTER: ICD-10-CM

## 2019-11-25 RX ORDER — IBUPROFEN 800 MG/1
TABLET, FILM COATED ORAL
Qty: 90 TABLET | Refills: 12 | Status: SHIPPED | OUTPATIENT
Start: 2019-11-25 | End: 2021-01-05

## 2020-01-12 DIAGNOSIS — F41.9 ANXIETY: Primary | ICD-10-CM

## 2020-01-13 NOTE — TELEPHONE ENCOUNTER
Ativan       Last Written Prescription Date:  8/28/2019  Last Fill Quantity: 30,   # refills: 1  Last Office Visit: 10/9/2019  Future Office visit:       Routing refill request to provider for review/approval because:  Drug not on the [facility], [facility] or [facility] refill protocol or controlled substance

## 2020-01-14 RX ORDER — LORAZEPAM 1 MG/1
TABLET ORAL
Qty: 30 TABLET | Refills: 0 | Status: SHIPPED | OUTPATIENT
Start: 2020-01-14 | End: 2020-03-17

## 2020-01-29 NOTE — PATIENT INSTRUCTIONS
Thank you for allowing Dr. Rodriguez and our surgical team to participate in your care. Please call our health unit coordinator at [phone] with scheduling questions or the nurse at [phone] with any other questions or concerns.

## 2020-02-02 DIAGNOSIS — I10 ESSENTIAL HYPERTENSION, BENIGN: ICD-10-CM

## 2020-02-04 RX ORDER — AMLODIPINE BESYLATE 10 MG/1
TABLET ORAL
Qty: 90 TABLET | Refills: 1 | Status: SHIPPED | OUTPATIENT
Start: 2020-02-04 | End: 2020-08-06

## 2020-02-04 RX ORDER — LISINOPRIL 10 MG/1
TABLET ORAL
Qty: 90 TABLET | Refills: 1 | Status: SHIPPED | OUTPATIENT
Start: 2020-02-04 | End: 2020-08-06

## 2020-02-06 ENCOUNTER — OFFICE VISIT (OUTPATIENT)
Dept: SURGERY | Facility: OTHER | Age: 45
End: 2020-02-06
Attending: SURGERY
Payer: COMMERCIAL

## 2020-02-06 VITALS
BODY MASS INDEX: 34.3 KG/M2 | HEIGHT: 71 IN | TEMPERATURE: 98.5 F | HEART RATE: 76 BPM | DIASTOLIC BLOOD PRESSURE: 84 MMHG | WEIGHT: 245 LBS | OXYGEN SATURATION: 98 % | SYSTOLIC BLOOD PRESSURE: 122 MMHG

## 2020-02-06 DIAGNOSIS — K21.00 GASTROESOPHAGEAL REFLUX DISEASE WITH ESOPHAGITIS: Primary | ICD-10-CM

## 2020-02-06 PROCEDURE — 99214 OFFICE O/P EST MOD 30 MIN: CPT | Performed by: SURGERY

## 2020-02-06 ASSESSMENT — MIFFLIN-ST. JEOR: SCORE: 2033.44

## 2020-02-06 ASSESSMENT — PAIN SCALES - GENERAL: PAINLEVEL: NO PAIN (0)

## 2020-02-06 NOTE — PROGRESS NOTES
CLINIC NOTE - FOLLOW UP  2/6/2020    Patient:Deepak Quevedo    Reason for Visit: Follow up from prior clinic visit for severe GERD    This is a 44 year old male here for follow up from a prior clinic visit for severe GERD. His prior medical records were reviewed.       He has completed his Nissen work-up.  Is here to discuss options.    Current Medications:  Current Outpatient Medications   Medication Sig Dispense Refill     amLODIPine (NORVASC) 10 MG tablet TAKE 1 TABLET DAILY 90 tablet 1     aspirin 81 MG EC tablet Take 1 tablet (81 mg) by mouth daily 90 tablet 3     atenolol (TENORMIN) 25 MG tablet Take 1 tablet (25 mg) by mouth daily 90 tablet 1     ibuprofen (ADVIL/MOTRIN) 800 MG tablet TAKE 1 TABLET EVERY 8 HOURS AS NEEDED FOR MODERATE PAIN 90 tablet 12     lisinopril (PRINIVIL/ZESTRIL) 10 MG tablet TAKE 1 TABLET DAILY 90 tablet 1     LORazepam (ATIVAN) 1 MG tablet TAKE 1 TABLET BY MOUTH DAILY AS NEEDED FOR ANXIETY 30 tablet 0     montelukast (SINGULAIR) 10 MG tablet TAKE 1 TABLET AT BEDTIME 30 tablet 5     omeprazole (PRILOSEC) 40 MG DR capsule TAKE 1 CAPSULE DAILY 30 TO 60 MINUTES BEFORE A MEAL 90 capsule 1     omeprazole (PRILOSEC) 40 MG DR capsule Take 1 capsule (40 mg) by mouth 2 times daily 180 capsule 3       Allergies:  No Known Allergies    RADIOLOGY:    Results for orders placed during the hospital encounter of 09/19/19   US Abdomen Limited    Narrative PROCEDURE: US ABDOMEN LIMITED 9/19/2019 8:40 AM    HISTORY: Gastroesophageal reflux disease, esophagitis presence not  specified    COMPARISONS: None.    TECHNIQUE: Ultrasound of the right upper quadrant.    FINDINGS: No gallstones are seen. There is no wall thickening or  pericholecystic fluid. Patient is not tender over the gallbladder wall  scanning. Common hepatic duct measures 2 mm.    Liver measures 17.8 cm in length. No focal hepatic mass is seen.  Pancreas is completely obscured. No right renal lesion is seen.         Impression IMPRESSION:  Pt notified that his e visit was disqualified, it was determined that his symptoms could not be treated electronically. He's aware he won't be charged for the visit. Pt requested appt with MATEO Gonzalez. No openings this afternoon.   States he will go to the [facility] in [place], [place]   No gallstones.    ELIESER VALDEZ MD   ,   Results for orders placed during the hospital encounter of 19   XR Esophagram    Narrative Exam: XR ESOPHAGRAM    History: Gastroesophageal reflux disease. Esophagitis.    Technique: Double contrast esophagram.    Findings: Cervical esophagus has normal appearance.    Thoracic esophagus has normal course, caliber and motility. No hiatal  hernia or spontaneous gastroesophageal reflux is seen. There is no  fixed area of narrowing or focal filling defect.    Gastric fundus has a normal appearance.             Impression Impression: Negative esophagram.    ELIESER VALDEZ MD   ,   Results for orders placed during the hospital encounter of 19   NM Gastric Emptying    Narrative PROCEDURE: NM GASTRIC EMPTYING 2019 2:42 PM    HISTORY: Gastroesophageal reflux disease, esophagitis presence not  specified    COMPARISONS: None.    TECHNIQUE: Gastric emptying time was calculated after ingestion of 4  ounces of egg mixed with 2 pieces of white toast and jelly containing  1.03 mCi technetium 99m sulfur colloid.    FINDINGS: Gastric emptying T 1/2  time is 115 minutes.    At 57 minutes there is 25% emptying, at 123 minutes 53% emptying, at  180 minutes 79% emptying and at 238 minutes 100% emptying.         Impression IMPRESSION: Gastric emptying T 1/2 time is 115 minutes.    ELIESER VALDEZ MD     ESOPHAGEAL MANOMETRY:    Patient Name: DEVONTE STEVEN  Date of Service: 2019 : 1975 Age: 44 Sex: M   MRN: [id number]   Patient Loc/Room: /  Provider: Juan Macario MD, Gastroenterology    GI PROCEDURE     SITE: [facility]  ORDERED BY:     PROCEDURE: Interpretation of high-resolution esophageal motility study.    INDICATION: History of reflux with a possible upcoming surgical procedure. This exam was requested by Dr. Rodriguez of General Surgery.    The high-resolution esophageal motility catheter was inserted by the nursing  staff and the study was performed with liquid swallows. The lower esophageal sphincter is identified between 49 and 53 cm. The resting LES pressure is slightly below normal at 12.0 mmHg. Integrated residual pressures at the LES post-swallow are normal at 3.8 mmHg. Percent relaxation at the LES with swallows is normal at 65%. Motility was then assessed throughout the esophageal body. Wave amplitude is normal. Distal contractile integral is normal at 970.2 mmHg/cm/sec. All swallows were peristaltic. There are no failed swallows.    IMPRESSION: Slightly hypotonic resting LES. LES behaviors otherwise normal and the motility throughout the esophagus appears normal. These results will be communicated to the ordering provider at [facility] for discussion with the patient.      Juan Macario MD   Gastroenterology [phone]  [place] [address]    CC: Eb Rodriguez MD, [facility], [address] [place] [address]       D: 11/20/2019 09:46/TGM TID #:[id number]   T: 11/20/2019 09:54/kmjacqui/maxim       Electronically signed by Juan Macario MD at 11/20/2019 10:45 AM CST      PATHOLOGY:    Copath Report   Date Value Ref Range Status   10/03/2019   Final    Patient Name: DEVONTE STEVEN  MR#: [id number]  Specimen #: [id number]  Collected: 10/3/2019  Received: 10/3/2019  Reported: 10/4/2019 14:25  Ordering Phy(s): EB RODRIGUEZ  Additional Phy(s): ELIESER WETZEL    For improved result formatting, select 'View Enhanced Report Format' under   Linked Documents section.    SPECIMEN(S):  A: Duodenal biopsy  B: Stomach biopsy, body  C: Gastroesophageal biopsy, junction    FINAL DIAGNOSIS:  A: Small bowel, duodenum, biopsy  - No pathologic diagnosis    B: Stomach, body, biopsy  - No pathologic diagnosis    C: Gastroesophageal junction, biopsy  - No pathologic diagnosis    I have personally reviewed all specimens and/or slides,  "including the   listed special stains, and used them  with my medical judgement to determine or confirm the final diagnosis.    Electronically signed out by:    Juan Benítez M.D.    CLINICAL HISTORY:  GERD; upper endoscopy with biopsies.    GROSS:  A: There is a 3 mm piece of tan soft tissue. (1 TE in 1 block).    B: There is a 3 mm piece of tan soft tissue. (1 TE in 1 block).    C: There are two pieces of tan soft tissue which are each 3 mm. (2 TE in 1   block). (Dictated by: Juan Benítez MD 10/3/2019 04:18 PM)    MICROSCOPIC:  A: There is unremarkable small bowel.    B: There is unremarkable gastric body.    C: There is squamous and gastric mucosa which is essentially unremarkable.    CPT Codes  A: 26455-ID4  B: 19667-EL5  C: 30485-NY7    COLLECTION SITE:  Client: [facility]  Location: [facility] ()    The technical component of this testing was completed at the [facility], with the  professional component performed at the [facility], [address] [place] [address]  ([phone])         PHYSICAL EXAM:     Vital signs: /84 (BP Location: Right arm, Patient Position: Chair, Cuff Size: Adult Large)   Pulse 76   Temp 98.5  F (36.9  C) (Tympanic)   Ht 1.803 m (5' 11\")   Wt 111.1 kg (245 lb)   SpO2 98%   BMI 34.17 kg/m     Weight: [unfilled]   BMI: Body mass index is 34.17 kg/m .   General: Normal, healthy, cooperative, in no acute distress, alert   Lungs: clear to auscultation   CV: Regular rate and rhythm without murmer   Abdominal: abdomen is soft without significant tenderness, masses, organomegaly or guarding     ASSESSMENT:  44 year old male here as follow up from a prior clinic visit for GERD.  His work-up indicates that he has lower esophageal incompetence.    PLAN: Because his work-up shows lower esophageal incompetence, with a pressure of 12 mmHg, most likely the cause of his reflux is secondary to that.  Of note he is relatively " overweight and some of the symptoms may be secondary to intra-abdominal pressure pushing up on his stomach and against a incompetent LES.  We had a long discussion about that.  I recommended that he lose weight prior to any surgery.  He does state that he is working on losing weight but he still wants to move forward with surgical options because he has had reflux for 30 years and is taking Nexium 80 mg a day.  I think that this is reasonable.  He is a candidate for a Nissen fundoplication.    He wants to wait for 2 to 3 months before having surgery.  I recommended that when he is ready for the scheduling of surgery that he make an appointment with me in clinic and at that point we will go through the full consent process and paperwork needed.  He is comfortable with that.  We will follow-up when ready to undergo surgical intervention.  He will stay on his Nexium until then.

## 2020-02-24 DIAGNOSIS — I10 ESSENTIAL HYPERTENSION, BENIGN: ICD-10-CM

## 2020-02-25 RX ORDER — ATENOLOL 25 MG/1
TABLET ORAL
Qty: 90 TABLET | Refills: 1 | Status: SHIPPED | OUTPATIENT
Start: 2020-02-25 | End: 2020-08-25

## 2020-02-25 NOTE — TELEPHONE ENCOUNTER
atenolol (TENORMIN) 25 MG tablet  Last visit date with prescribing provider: 8-  Last refill date: 8-  Quantity: 90, Refills: 1    Sanjuana López LPN

## 2020-03-16 DIAGNOSIS — F41.9 ANXIETY: ICD-10-CM

## 2020-03-17 RX ORDER — LORAZEPAM 1 MG/1
TABLET ORAL
Qty: 30 TABLET | Refills: 0 | Status: SHIPPED | OUTPATIENT
Start: 2020-03-17 | End: 2020-05-29

## 2020-03-17 NOTE — TELEPHONE ENCOUNTER
LORazepam (ATIVAN) 1 MG tablet       Last Written Prescription Date:  1/14/20  Last Fill Quantity: 30,   # refills: 0  Last Office Visit: 8/28/19  Future Office visit:       Routing refill request to provider for review/approval because:  Drug not on the [facility], [facility] or [facility] refill protocol or controlled substance

## 2020-05-26 DIAGNOSIS — F41.9 ANXIETY: ICD-10-CM

## 2020-05-28 NOTE — TELEPHONE ENCOUNTER
ativan      Last Written Prescription Date:  3/17/2020  Last Fill Quantity: 30,   # refills: 0  Last Office Visit: 8/28/2019

## 2020-05-29 RX ORDER — LORAZEPAM 1 MG/1
TABLET ORAL
Qty: 30 TABLET | Refills: 0 | Status: SHIPPED | OUTPATIENT
Start: 2020-05-29 | End: 2020-08-04

## 2020-07-31 DIAGNOSIS — F41.9 ANXIETY: ICD-10-CM

## 2020-08-02 DIAGNOSIS — I10 ESSENTIAL HYPERTENSION, BENIGN: ICD-10-CM

## 2020-08-04 RX ORDER — LORAZEPAM 1 MG/1
TABLET ORAL
Qty: 30 TABLET | Refills: 1 | Status: SHIPPED | OUTPATIENT
Start: 2020-08-04 | End: 2021-01-05

## 2020-08-05 NOTE — TELEPHONE ENCOUNTER
amLODIPine (NORVASC) 10 MG tablet     Last Written Prescription Date:  02/04/2020  Last Fill Quantity: 02/04/2020,   # refills: 1  Last Office Visit: 0828/2019     lisinopril (PRINIVIL/ZESTRIL) 10 MG tablet     Last Written Prescription Date:  02/04/2020  Last Fill Quantity: 90,   # refills: 1

## 2020-08-06 RX ORDER — LISINOPRIL 10 MG/1
TABLET ORAL
Qty: 90 TABLET | Refills: 0 | Status: SHIPPED | OUTPATIENT
Start: 2020-08-06 | End: 2020-11-03

## 2020-08-06 RX ORDER — AMLODIPINE BESYLATE 10 MG/1
TABLET ORAL
Qty: 90 TABLET | Refills: 0 | Status: SHIPPED | OUTPATIENT
Start: 2020-08-06 | End: 2020-11-03

## 2020-08-23 DIAGNOSIS — I10 ESSENTIAL HYPERTENSION, BENIGN: ICD-10-CM

## 2020-08-25 RX ORDER — ATENOLOL 25 MG/1
TABLET ORAL
Qty: 90 TABLET | Refills: 0 | Status: SHIPPED | OUTPATIENT
Start: 2020-08-25 | End: 2021-01-05

## 2020-10-31 DIAGNOSIS — I10 ESSENTIAL HYPERTENSION, BENIGN: ICD-10-CM

## 2020-11-02 NOTE — TELEPHONE ENCOUNTER
Norvasc       Last Written Prescription Date:  8/6/2020  Last Fill Quantity: 90,   # refills: 0    Lisinopril       Last Written Prescription Date:  8/6/2020  Last Fill Quantity: 90,   # refills: 0  Last Office Visit: 8/28/2019  Future Office visit:

## 2020-11-02 NOTE — TELEPHONE ENCOUNTER
LOV over 1 yr ago  Needs appt  I cans ee him this week.    [facility]  [phone]

## 2020-11-03 RX ORDER — AMLODIPINE BESYLATE 10 MG/1
TABLET ORAL
Qty: 30 TABLET | Refills: 0 | Status: SHIPPED | OUTPATIENT
Start: 2020-11-03 | End: 2021-01-05

## 2020-11-03 RX ORDER — LISINOPRIL 10 MG/1
TABLET ORAL
Qty: 30 TABLET | Refills: 0 | Status: SHIPPED | OUTPATIENT
Start: 2020-11-03 | End: 2021-01-05

## 2020-11-19 ENCOUNTER — RESULTS ONLY (OUTPATIENT)
Dept: LAB | Age: 45
End: 2020-11-19

## 2020-11-19 LAB
LABORATORY COMMENT REPORT: NORMAL
SARS-COV-2 RNA SPEC QL NAA+PROBE: NEGATIVE
SPECIMEN SOURCE: NORMAL

## 2020-11-19 PROCEDURE — C9803 HOPD COVID-19 SPEC COLLECT: HCPCS

## 2020-11-21 DIAGNOSIS — I10 ESSENTIAL HYPERTENSION, BENIGN: ICD-10-CM

## 2020-11-23 NOTE — TELEPHONE ENCOUNTER
atenolol      Last Written Prescription Date:  8/25/2020  Last Fill Quantity: 90,   # refills: 0  Last Office Visit: 8/28/19  Future Office visit:

## 2020-12-28 NOTE — PROGRESS NOTES
SUBJECTIVE:   CC: Deepak Quevedo is an 45 year old male who presents for preventive health visit.   Patient has been advised of split billing requirements and indicates understanding: Yes         Healthy Habits:    Do you get at least three servings of calcium containing foods daily (dairy, green leafy vegetables, etc.)? yes    Amount of exercise or daily activities, outside of work: minimal    Problems taking medications regularly No    Medication side effects: No    Have you had an eye exam in the past two years? yes    Do you see a dentist twice per year? yes    Do you have sleep apnea, excessive snoring or daytime drowsiness?no        GERD/Heartburn    Duration: 2011    Description (location/character/radiation):     Intensity:  moderate    Accompanying signs and symptoms:  food getting stuck: no   nausea/vomiting/blood: no   abdominal pain: no   black/tarry or bloody stools: no :    History (similar episodes/previous evaluation): None    Precipitating or alleviating factors:  worse with no particular food or drink.  current NSAID/Aspirin use: YES    Therapies tried and outcome: Omeprazole (Prilosec) and medication helpful        Hypertension Follow-up    Do you check your blood pressure regularly outside of the clinic? No     Are you following a low salt diet? No    Are your blood pressures ever more than 140 on the top number (systolic) OR more   than 90 on the bottom number (diastolic), for example 140/90? No        Abuse: Current or Past(Physical, Sexual or Emotional)- No  Do you feel safe in your environment? Yes      Social History     Tobacco Use     Smoking status: Current Some Day Smoker     Packs/day: 0.20     Years: 15.00     Pack years: 3.00     Types: Cigarettes     Smokeless tobacco: Never Used     Tobacco comment: Longest period tobacco-free: 6 months; relapse due to social pressure; passive smoke exposure (yes)   Substance Use Topics     Alcohol use: Yes     Comment: 3 beers  daily/occasionally (?)       If you drink alcohol do you typically have >3 drinks per day or >7 drinks per week? No                        Last PSA: No results found for: PSA       Reviewed orders with patient. Reviewed health maintenance and updated orders accordingly - Yes  Lab work is in process  Labs reviewed in EPIC  BP Readings from Last 3 Encounters:   01/05/21 136/78   02/06/20 122/84   10/09/19 122/72    Wt Readings from Last 3 Encounters:   01/05/21 120.6 kg (265 lb 12.8 oz)   02/06/20 111.1 kg (245 lb)   10/09/19 111.1 kg (245 lb)                  Patient Active Problem List   Diagnosis     Essential hypertension, benign     GERD (gastroesophageal reflux disease)     Allergic rhinitis     Tobacco abuse     Panic disorder     Past Surgical History:   Procedure Laterality Date     CIRCUMCISION       ESOPHAGOSCOPY, GASTROSCOPY, DUODENOSCOPY (EGD), COMBINED  4/22/2013    Procedure: COMBINED ESOPHAGOSCOPY, GASTROSCOPY, DUODENOSCOPY (EGD);  UPPER ENDOSCOPY with Biopsy;  Surgeon: Ashley Narayanan DO;  [facility]: [facility]     ESOPHAGOSCOPY, GASTROSCOPY, DUODENOSCOPY (EGD), COMBINED N/A 10/3/2019    Procedure: UPPER ENDOSCOPY WITH BIOPSIES;  Surgeon: Lionel Rodriguez MD;  Location: [facility]     TONSILLECTOMY         Social History     Tobacco Use     Smoking status: Current Some Day Smoker     Packs/day: 0.20     Years: 15.00     Pack years: 3.00     Types: Cigarettes     Smokeless tobacco: Never Used     Tobacco comment: Longest period tobacco-free: 6 months; relapse due to social pressure; passive smoke exposure (yes)   Substance Use Topics     Alcohol use: Yes     Comment: 3 beers daily/occasionally (?)     Family History   Problem Relation Age of Onset     Cancer Maternal Grandmother         Cause of death     Diabetes Maternal Grandmother 67     Diabetes Maternal Grandfather 70     Diabetes Father              Current Outpatient Medications   Medication Sig Dispense Refill     amLODIPine (NORVASC) 10 MG  tablet TAKE 1 TABLET DAILY 30 tablet 0     aspirin 81 MG EC tablet Take 1 tablet (81 mg) by mouth daily 90 tablet 3     atenolol (TENORMIN) 25 MG tablet TAKE 1 TABLET DAILY 90 tablet 0     ibuprofen (ADVIL/MOTRIN) 800 MG tablet TAKE 1 TABLET EVERY 8 HOURS AS NEEDED FOR MODERATE PAIN 90 tablet 12     lisinopril (ZESTRIL) 10 MG tablet TAKE 1 TABLET DAILY 30 tablet 0     LORazepam (ATIVAN) 1 MG tablet TAKE 1 TABLET BY MOUTH DAILY AS NEEDED FOR ANXIETY 30 tablet 1     omeprazole (PRILOSEC) 40 MG DR capsule TAKE 1 CAPSULE TWICE A  capsule 3     montelukast (SINGULAIR) 10 MG tablet TAKE 1 TABLET AT BEDTIME (Patient not taking: Reported on 1/5/2021) 30 tablet 5         No Known Allergies       Recent Labs   Lab Test 08/30/19  0758 05/30/18  1333 05/14/18  1031 06/04/14  1456 06/04/14  1456   LDL 95  --  95  --  66   HDL 40  --  32*  --  58   TRIG 116  --  100  --  151*   * 89* 242*  --   --    CR 0.76  --  0.80   < > 1.03   GFRESTIMATED >90  --  >90   < > 82   GFRESTBLACK >90  --  >90   < > >90   POTASSIUM 3.6  --  3.8   < > 4.6   TSH 2.36  --  1.25   < > 0.59    < > = values in this interval not displayed.        Reviewed and updated as needed this visit by clinical staff  Tobacco  Allergies  Meds              Reviewed and updated as needed this visit by Provider                Past Medical History:   Diagnosis Date     Allergic rhinitis 06/07/2011     Essential hypertension, benign 10/27/2010     GERD (gastroesophageal reflux disease) 06/07/2011     Obesity 06/07/2011     Panic disorder 06/07/2011     Tobacco abuse 11/16/2012          Past Surgical History:   Procedure Laterality Date     CIRCUMCISION       ESOPHAGOSCOPY, GASTROSCOPY, DUODENOSCOPY (EGD), COMBINED  4/22/2013    Procedure: COMBINED ESOPHAGOSCOPY, GASTROSCOPY, DUODENOSCOPY (EGD);  UPPER ENDOSCOPY with Biopsy;  Surgeon: Ashley Narayanan DO;  [facility]     ESOPHAGOSCOPY, GASTROSCOPY, DUODENOSCOPY (EGD), COMBINED N/A 10/3/2019     Procedure: UPPER ENDOSCOPY WITH BIOPSIES;  Surgeon: Lionel Rodriguez MD;  Location: [facility]     TONSILLECTOMY           ROS:  CONSTITUTIONAL: NEGATIVE for fever, chills, change in weight  INTEGUMENTARY/SKIN: NEGATIVE for worrisome rashes, moles or lesions  EYES: NEGATIVE for vision changes or irritation  ENT: NEGATIVE for ear, mouth and throat problems  RESP: NEGATIVE for significant cough or SOB  CV: NEGATIVE for chest pain, palpitations or peripheral edema  GI: NEGATIVE for nausea, abdominal pain, heartburn, or change in bowel habits   male: negative for dysuria, hematuria, decreased urinary stream, erectile dysfunction, urethral discharge  MUSCULOSKELETAL: NEGATIVE for significant arthralgias or myalgia  NEURO: NEGATIVE for weakness, dizziness or paresthesias  PSYCHIATRIC: NEGATIVE for changes in mood or affect      OBJECTIVE:   /78   Pulse 79   Temp 98.8  F (37.1  C) (Tympanic)   Ht 1.829 m (6')   Wt 120.6 kg (265 lb 12.8 oz)   SpO2 96%   BMI 36.05 kg/m         EXAM:  GENERAL: healthy, alert and no distress  EYES: Eyes grossly normal to inspection, PERRL and conjunctivae and sclerae normal  HENT: ear canals and TM's normal, nose and mouth without ulcers or lesions  NECK: no adenopathy, no asymmetry, masses, or scars and thyroid normal to palpation  RESP: lungs clear to auscultation - no rales, rhonchi or wheezes  CV: regular rate and rhythm, normal S1 S2, no S3 or S4, no murmur, click or rub, no peripheral edema and peripheral pulses strong  ABDOMEN: soft, nontender, no hepatosplenomegaly, no masses and bowel sounds normal  MS: no gross musculoskeletal defects noted, no edema  SKIN: no suspicious lesions or rashes  PSYCH: mentation appears normal, affect normal/bright        ASSESSMENT/PLAN:   1. Routine general medical examination at a health care facility  - Lipid Profile (Chol, Trig, HDL, LDL calc)  - CBC with platelets and differential  - Comprehensive metabolic panel (BMP + Alb, Alk Phos,  ALT, AST, Total. Bili, TP)  - TSH with free T4 reflex    2. Essential hypertension, benign  - Comprehensive metabolic panel (BMP + Alb, Alk Phos, ALT, AST, Total. Bili, TP)  - amLODIPine (NORVASC) 10 MG tablet; Take 1 tablet (10 mg) by mouth daily  Dispense: 90 tablet; Refill: 1  - lisinopril (ZESTRIL) 10 MG tablet; Take 1 tablet (10 mg) by mouth daily  Dispense: 90 tablet; Refill: 3  - atenolol (TENORMIN) 25 MG tablet; Take 1 tablet (25 mg) by mouth daily  Dispense: 14 tablet; Refill: 0    3. Screening for hyperlipidemia  - Lipid Profile (Chol, Trig, HDL, LDL calc)    4. Strain of lumbar region, initial encounter  - ibuprofen (ADVIL/MOTRIN) 800 MG tablet; TAKE 1 TABLET EVERY 8 HOURS AS NEEDED FOR MODERATE PAIN  Dispense: 90 tablet; Refill: 12    5. Morbid obesity (H)  - Work on weight - diet, exercise    6. Gastroesophageal reflux disease with esophagitis, unspecified whether hemorrhage  - omeprazole (PRILOSEC) 40 MG DR capsule; TAKE 1 CAPSULE TWICE A DAY  Dispense: 30 capsule; Refill: 0    8. Anxiety  - LORazepam (ATIVAN) 1 MG tablet; TAKE 1 TABLET BY MOUTH DAILY AS NEEDED FOR ANXIETY  Dispense: 30 tablet; Refill: 1      Patient has been advised of split billing requirements and indicates understanding: Yes      Estimated body mass index is 36.05 kg/m  as calculated from the following:    Height as of this encounter: 1.829 m (6').    Weight as of this encounter: 120.6 kg (265 lb 12.8 oz).        He reports that he has been smoking cigarettes. He has a 3.00 pack-year smoking history. He has never used smokeless tobacco.         Tobacco Cessation Action Plan:   Information offered: Patient not interested at this time      Counseling Resources:  ATP IV Guidelines  Pooled Cohorts Equation Calculator  FRAX Risk Assessment  ICSI Preventive Guidelines  Dietary Guidelines for Americans, 2010  USDA's MyPlate  ASA Prophylaxis  Lung CA Screening      Lorena Blake CNP  [facility]

## 2020-12-28 NOTE — PATIENT INSTRUCTIONS
ASSESSMENT/PLAN:   1. Routine general medical examination at a health care facility  - Lipid Profile (Chol, Trig, HDL, LDL calc)  - CBC with platelets and differential  - Comprehensive metabolic panel (BMP + Alb, Alk Phos, ALT, AST, Total. Bili, TP)  - TSH with free T4 reflex    2. Essential hypertension, benign  - Comprehensive metabolic panel (BMP + Alb, Alk Phos, ALT, AST, Total. Bili, TP)  - amLODIPine (NORVASC) 10 MG tablet; Take 1 tablet (10 mg) by mouth daily  Dispense: 90 tablet; Refill: 1  - lisinopril (ZESTRIL) 10 MG tablet; Take 1 tablet (10 mg) by mouth daily  Dispense: 90 tablet; Refill: 3  - atenolol (TENORMIN) 25 MG tablet; Take 1 tablet (25 mg) by mouth daily  Dispense: 14 tablet; Refill: 0    3. Screening for hyperlipidemia  - Lipid Profile (Chol, Trig, HDL, LDL calc)    4. Strain of lumbar region, initial encounter  - ibuprofen (ADVIL/MOTRIN) 800 MG tablet; TAKE 1 TABLET EVERY 8 HOURS AS NEEDED FOR MODERATE PAIN  Dispense: 90 tablet; Refill: 12    5. Morbid obesity (H)  - Work on weight - diet, exercise    6. Gastroesophageal reflux disease with esophagitis, unspecified whether hemorrhage  - omeprazole (PRILOSEC) 40 MG DR capsule; TAKE 1 CAPSULE TWICE A DAY  Dispense: 30 capsule; Refill: 0    8. Anxiety  - LORazepam (ATIVAN) 1 MG tablet; TAKE 1 TABLET BY MOUTH DAILY AS NEEDED FOR ANXIETY  Dispense: 30 tablet; Refill: 1      Patient has been advised of split billing requirements and indicates understanding: Yes      Estimated body mass index is 36.05 kg/m  as calculated from the following:    Height as of this encounter: 1.829 m (6').    Weight as of this encounter: 120.6 kg (265 lb 12.8 oz).        He reports that he has been smoking cigarettes. He has a 3.00 pack-year smoking history. He has never used smokeless tobacco.         Tobacco Cessation Action Plan:   Information offered: Patient not interested at this time      Counseling Resources:  ATP IV Guidelines  Pooled Cohorts Equation  Calculator  FRAX Risk Assessment  ICSI Preventive Guidelines  Dietary Guidelines for Americans, 2010  USDA's MyPlate  ASA Prophylaxis  Lung CA Screening        Lorena Blake CNP  [facility]        Preventive Health Recommendations  Male Ages 40 to 49    Yearly exam:             See your health care provider every year in order to  o   Review health changes.   o   Discuss preventive care.    o   Review your medicines if your doctor has prescribed any.    You should be tested each year for STDs (sexually transmitted diseases) if you re at risk.     Have a cholesterol test every 5 years.     Have a colonoscopy (test for colon cancer) if someone in your family has had colon cancer or polyps before age 50.     After age 45, have a diabetes test (fasting glucose). If you are at risk for diabetes, you should have this test every 3 years.      Talk with your health care provider about whether or not a prostate cancer screening test (PSA) is right for you.    Shots: Get a flu shot each year. Get a tetanus shot every 10 years.     Nutrition:    Eat at least 5 servings of fruits and vegetables daily.     Eat whole-grain bread, whole-wheat pasta and brown rice instead of white grains and rice.     Get adequate Calcium and Vitamin D.     Lifestyle    Exercise for at least 150 minutes a week (30 minutes a day, 5 days a week). This will help you control your weight and prevent disease.     Limit alcohol to one drink per day.     No smoking.     Wear sunscreen to prevent skin cancer.     See your dentist every six months for an exam and cleaning.

## 2021-01-05 ENCOUNTER — OFFICE VISIT (OUTPATIENT)
Dept: FAMILY MEDICINE | Facility: OTHER | Age: 46
End: 2021-01-05
Attending: NURSE PRACTITIONER
Payer: COMMERCIAL

## 2021-01-05 VITALS
HEART RATE: 79 BPM | BODY MASS INDEX: 36 KG/M2 | SYSTOLIC BLOOD PRESSURE: 136 MMHG | WEIGHT: 265.8 LBS | HEIGHT: 72 IN | TEMPERATURE: 98.8 F | OXYGEN SATURATION: 96 % | DIASTOLIC BLOOD PRESSURE: 78 MMHG

## 2021-01-05 DIAGNOSIS — K21.9 GASTROESOPHAGEAL REFLUX DISEASE WITHOUT ESOPHAGITIS: ICD-10-CM

## 2021-01-05 DIAGNOSIS — Z00.00 ROUTINE GENERAL MEDICAL EXAMINATION AT A HEALTH CARE FACILITY: ICD-10-CM

## 2021-01-05 DIAGNOSIS — I10 ESSENTIAL HYPERTENSION, BENIGN: Primary | ICD-10-CM

## 2021-01-05 DIAGNOSIS — S39.012A STRAIN OF LUMBAR REGION, INITIAL ENCOUNTER: ICD-10-CM

## 2021-01-05 DIAGNOSIS — F41.9 ANXIETY: ICD-10-CM

## 2021-01-05 DIAGNOSIS — E66.01 MORBID OBESITY (H): ICD-10-CM

## 2021-01-05 DIAGNOSIS — Z13.220 SCREENING FOR HYPERLIPIDEMIA: ICD-10-CM

## 2021-01-05 DIAGNOSIS — K21.00 GASTROESOPHAGEAL REFLUX DISEASE WITH ESOPHAGITIS, UNSPECIFIED WHETHER HEMORRHAGE: ICD-10-CM

## 2021-01-05 LAB
ALBUMIN SERPL-MCNC: 3.6 G/DL (ref 3.4–5)
ALP SERPL-CCNC: 75 U/L (ref 40–150)
ALT SERPL W P-5'-P-CCNC: 254 U/L (ref 0–70)
ANION GAP SERPL CALCULATED.3IONS-SCNC: 8 MMOL/L (ref 3–14)
AST SERPL W P-5'-P-CCNC: 194 U/L (ref 0–45)
BASOPHILS # BLD AUTO: 0 10E9/L (ref 0–0.2)
BASOPHILS NFR BLD AUTO: 0.2 %
BILIRUB SERPL-MCNC: 0.4 MG/DL (ref 0.2–1.3)
BUN SERPL-MCNC: 9 MG/DL (ref 7–30)
CALCIUM SERPL-MCNC: 9.1 MG/DL (ref 8.5–10.1)
CHLORIDE SERPL-SCNC: 108 MMOL/L (ref 94–109)
CHOLEST SERPL-MCNC: 166 MG/DL
CO2 SERPL-SCNC: 27 MMOL/L (ref 20–32)
CREAT SERPL-MCNC: 0.73 MG/DL (ref 0.66–1.25)
DIFFERENTIAL METHOD BLD: ABNORMAL
EOSINOPHIL # BLD AUTO: 0.1 10E9/L (ref 0–0.7)
EOSINOPHIL NFR BLD AUTO: 2.2 %
ERYTHROCYTE [DISTWIDTH] IN BLOOD BY AUTOMATED COUNT: 16.4 % (ref 10–15)
GFR SERPL CREATININE-BSD FRML MDRD: >90 ML/MIN/{1.73_M2}
GLUCOSE SERPL-MCNC: 107 MG/DL (ref 70–99)
HCT VFR BLD AUTO: 38.2 % (ref 40–53)
HDLC SERPL-MCNC: 30 MG/DL
HGB BLD-MCNC: 11.8 G/DL (ref 13.3–17.7)
LDLC SERPL CALC-MCNC: 98 MG/DL
LYMPHOCYTES # BLD AUTO: 2 10E9/L (ref 0.8–5.3)
LYMPHOCYTES NFR BLD AUTO: 37.6 %
MCH RBC QN AUTO: 23.8 PG (ref 26.5–33)
MCHC RBC AUTO-ENTMCNC: 30.9 G/DL (ref 31.5–36.5)
MCV RBC AUTO: 77 FL (ref 78–100)
MONOCYTES # BLD AUTO: 0.6 10E9/L (ref 0–1.3)
MONOCYTES NFR BLD AUTO: 11.1 %
NEUTROPHILS # BLD AUTO: 2.7 10E9/L (ref 1.6–8.3)
NEUTROPHILS NFR BLD AUTO: 48.9 %
NONHDLC SERPL-MCNC: 136 MG/DL
PLATELET # BLD AUTO: 254 10E9/L (ref 150–450)
POTASSIUM SERPL-SCNC: 3.6 MMOL/L (ref 3.4–5.3)
PROT SERPL-MCNC: 8.6 G/DL (ref 6.8–8.8)
RBC # BLD AUTO: 4.95 10E12/L (ref 4.4–5.9)
SODIUM SERPL-SCNC: 143 MMOL/L (ref 133–144)
TRIGL SERPL-MCNC: 190 MG/DL
TSH SERPL DL<=0.005 MIU/L-ACNC: 0.89 MU/L (ref 0.4–4)
WBC # BLD AUTO: 5.4 10E9/L (ref 4–11)

## 2021-01-05 PROCEDURE — 36415 COLL VENOUS BLD VENIPUNCTURE: CPT | Performed by: NURSE PRACTITIONER

## 2021-01-05 PROCEDURE — 80050 GENERAL HEALTH PANEL: CPT | Performed by: NURSE PRACTITIONER

## 2021-01-05 PROCEDURE — 80061 LIPID PANEL: CPT | Performed by: NURSE PRACTITIONER

## 2021-01-05 PROCEDURE — 90670 PCV13 VACCINE IM: CPT | Performed by: NURSE PRACTITIONER

## 2021-01-05 PROCEDURE — 90686 IIV4 VACC NO PRSV 0.5 ML IM: CPT | Performed by: NURSE PRACTITIONER

## 2021-01-05 PROCEDURE — 90471 IMMUNIZATION ADMIN: CPT | Performed by: NURSE PRACTITIONER

## 2021-01-05 PROCEDURE — 99396 PREV VISIT EST AGE 40-64: CPT | Mod: 25 | Performed by: NURSE PRACTITIONER

## 2021-01-05 PROCEDURE — 90472 IMMUNIZATION ADMIN EACH ADD: CPT | Performed by: NURSE PRACTITIONER

## 2021-01-05 RX ORDER — AMLODIPINE BESYLATE 10 MG/1
10 TABLET ORAL DAILY
Qty: 90 TABLET | Refills: 1 | Status: SHIPPED | OUTPATIENT
Start: 2021-01-05 | End: 2021-07-06

## 2021-01-05 RX ORDER — OMEPRAZOLE 40 MG/1
CAPSULE, DELAYED RELEASE ORAL
Qty: 180 CAPSULE | Refills: 3 | Status: SHIPPED | OUTPATIENT
Start: 2021-01-05 | End: 2021-01-05

## 2021-01-05 RX ORDER — ATENOLOL 25 MG/1
25 TABLET ORAL DAILY
Qty: 90 TABLET | Refills: 3 | Status: SHIPPED | OUTPATIENT
Start: 2021-01-05 | End: 2021-01-05

## 2021-01-05 RX ORDER — LORAZEPAM 1 MG/1
TABLET ORAL
Qty: 30 TABLET | Refills: 1 | Status: SHIPPED | OUTPATIENT
Start: 2021-01-05 | End: 2021-05-24

## 2021-01-05 RX ORDER — IBUPROFEN 800 MG/1
TABLET, FILM COATED ORAL
Qty: 90 TABLET | Refills: 12 | Status: SHIPPED | OUTPATIENT
Start: 2021-01-05 | End: 2022-01-13

## 2021-01-05 RX ORDER — ATENOLOL 25 MG/1
25 TABLET ORAL DAILY
Qty: 14 TABLET | Refills: 0 | Status: SHIPPED | OUTPATIENT
Start: 2021-01-05 | End: 2021-08-03

## 2021-01-05 RX ORDER — OMEPRAZOLE 40 MG/1
CAPSULE, DELAYED RELEASE ORAL
Qty: 30 CAPSULE | Refills: 0 | Status: SHIPPED | OUTPATIENT
Start: 2021-01-05 | End: 2021-08-03

## 2021-01-05 RX ORDER — LISINOPRIL 10 MG/1
10 TABLET ORAL DAILY
Qty: 90 TABLET | Refills: 3 | Status: SHIPPED | OUTPATIENT
Start: 2021-01-05 | End: 2021-08-03

## 2021-01-05 RX ORDER — ATENOLOL 25 MG/1
TABLET ORAL
Qty: 90 TABLET | Refills: 3 | Status: SHIPPED | OUTPATIENT
Start: 2021-01-05 | End: 2021-08-02

## 2021-01-05 ASSESSMENT — ANXIETY QUESTIONNAIRES
6. BECOMING EASILY ANNOYED OR IRRITABLE: NOT AT ALL
IF YOU CHECKED OFF ANY PROBLEMS ON THIS QUESTIONNAIRE, HOW DIFFICULT HAVE THESE PROBLEMS MADE IT FOR YOU TO DO YOUR WORK, TAKE CARE OF THINGS AT HOME, OR GET ALONG WITH OTHER PEOPLE: NOT DIFFICULT AT ALL
5. BEING SO RESTLESS THAT IT IS HARD TO SIT STILL: NOT AT ALL
GAD7 TOTAL SCORE: 1
3. WORRYING TOO MUCH ABOUT DIFFERENT THINGS: NOT AT ALL
4. TROUBLE RELAXING: NOT AT ALL
7. FEELING AFRAID AS IF SOMETHING AWFUL MIGHT HAPPEN: NOT AT ALL
2. NOT BEING ABLE TO STOP OR CONTROL WORRYING: NOT AT ALL
1. FEELING NERVOUS, ANXIOUS, OR ON EDGE: SEVERAL DAYS

## 2021-01-05 ASSESSMENT — MIFFLIN-ST. JEOR: SCORE: 2138.66

## 2021-01-05 ASSESSMENT — PATIENT HEALTH QUESTIONNAIRE - PHQ9: SUM OF ALL RESPONSES TO PHQ QUESTIONS 1-9: 5

## 2021-01-05 ASSESSMENT — PAIN SCALES - GENERAL: PAINLEVEL: NO PAIN (0)

## 2021-01-05 NOTE — NURSING NOTE
Chief Complaint   Patient presents with     Physical       Initial /82   Pulse 79   Temp 98.8  F (37.1  C) (Tympanic)   Ht 1.829 m (6')   Wt 120.6 kg (265 lb 12.8 oz)   SpO2 96%   BMI 36.05 kg/m   Estimated body mass index is 36.05 kg/m  as calculated from the following:    Height as of this encounter: 1.829 m (6').    Weight as of this encounter: 120.6 kg (265 lb 12.8 oz).  Medication Reconciliation: complete  Shavon Abel LPN

## 2021-01-06 ASSESSMENT — ANXIETY QUESTIONNAIRES: GAD7 TOTAL SCORE: 1

## 2021-05-23 DIAGNOSIS — F41.9 ANXIETY: ICD-10-CM

## 2021-05-24 RX ORDER — LORAZEPAM 1 MG/1
TABLET ORAL
Qty: 30 TABLET | Refills: 0 | Status: SHIPPED | OUTPATIENT
Start: 2021-05-24 | End: 2021-10-14

## 2021-05-24 NOTE — TELEPHONE ENCOUNTER
LORazepam (ATIVAN) 1 MG tablet      Last Written Prescription Date:  1/5/21  Last Fill Quantity: 30,   # refills: 1  Last Office Visit: 1/5/21  Future Office visit:    Next 5 appointments (look out 90 days)    Jul 26, 2021 10:30 AM  (Arrive by 10:15 AM)  SHORT with Lorena Blake CNP  [facility] ([facility] ) [address]  [facility] [address]  [phone]           Routing refill request to provider for review/approval because:  Drug not on the [facility], P or [facility] refill protocol or controlled substance

## 2021-07-04 DIAGNOSIS — I10 ESSENTIAL HYPERTENSION, BENIGN: ICD-10-CM

## 2021-07-06 RX ORDER — AMLODIPINE BESYLATE 10 MG/1
TABLET ORAL
Qty: 90 TABLET | Refills: 3 | Status: SHIPPED | OUTPATIENT
Start: 2021-07-06 | End: 2022-06-20

## 2021-07-06 NOTE — TELEPHONE ENCOUNTER
NORVASC      Last Written Prescription Date:  1-5-2021  Last Fill Quantity: 90,   # refills: 1  Last Office Visit: 1-5-2021  Future Office visit:    Next 5 appointments (look out 90 days)    Jul 26, 2021 10:30 AM  (Arrive by 10:15 AM)  [facility] CNP  [facility] [address]  [facility] [address]  [phone]           Routing refill request to provider for review/approval because:

## 2021-07-27 NOTE — PROGRESS NOTES
Assessment & Plan       Essential hypertension, benign  - Lipid Profile (Chol, Trig, HDL, LDL calc)  - Comprehensive metabolic panel  - atenolol (TENORMIN) 25 MG tablet; Take 1 tablet (25 mg) by mouth daily  - lisinopril (ZESTRIL) 10 MG tablet; Take 1 tablet (10 mg) by mouth daily    Gastroesophageal reflux disease without esophagitis  - omeprazole (PRILOSEC) 40 MG DR capsule; TAKE 1 CAPSULE TWICE A DAY    Panic disorder  - Continue plan of care        Return in about 6 months (around 2/3/2022).      Lorena Blake CNP  [facility] SUSANNA Sotelo is a 45 year old who presents for the following health issues       Anxiety Follow-Up    How are you doing with your anxiety since your last visit? No change    Are you having other symptoms that might be associated with anxiety? No    Have you had a significant life event? No     Are you feeling depressed? No    Do you have any concerns with your use of alcohol or other drugs? No      Social History     Tobacco Use     Smoking status: Current Some Day Smoker     Packs/day: 0.20     Years: 15.00     Pack years: 3.00     Types: Cigarettes     Smokeless tobacco: Never Used     Tobacco comment: Longest period tobacco-free: 6 months; relapse due to social pressure; passive smoke exposure (yes)   Substance Use Topics     Alcohol use: Yes     Comment: 3 beers daily/occasionally (?)     Drug use: No       EMELINA-7 SCORE 8/28/2019 1/5/2021 8/3/2021   Total Score 0 1 0       PHQ 8/28/2019 1/5/2021 8/3/2021   PHQ-9 Total Score 2 5 0   Q9: Thoughts of better off dead/self-harm past 2 weeks Not at all Not at all Not at all         How many servings of fruits and vegetables do you eat daily?  4 or more    On average, how many sweetened beverages do you drink each day (Examples: soda, juice, sweet tea, etc.  Do NOT count diet or artificially sweetened beverages)?   1    How many days per week do you exercise enough to make your heart beat faster? 3 or less    How many  minutes a day do you exercise enough to make your heart beat faster? 30 - 60    How many days per week do you miss taking your medication? 0      GERD/Heartburn  Onset/Duration: years   Description: asymptomatic,    Intensity: mild  Progression of Symptoms: same  Accompanying Signs & Symptoms:  Does it feel like food gets stuck or trouble swallowing: YES  Nausea: YES  Vomiting (bloody?): no  Abdominal Pain: no  Black-Tarry stools: no  Bloody stools: no      Hypertension Follow-up    Do you check your blood pressure regularly outside of the clinic? No     Are you following a low salt diet? No    Are your blood pressures ever more than 140 on the top number (systolic) OR more   than 90 on the bottom number (diastolic), for example 140/90? No       Patient Active Problem List   Diagnosis     Essential hypertension, benign     GERD (gastroesophageal reflux disease)     Allergic rhinitis     Tobacco abuse     Panic disorder     Morbid obesity (H)     Past Surgical History:   Procedure Laterality Date     CIRCUMCISION       ESOPHAGOSCOPY, GASTROSCOPY, DUODENOSCOPY (EGD), COMBINED  4/22/2013    Procedure: COMBINED ESOPHAGOSCOPY, GASTROSCOPY, DUODENOSCOPY (EGD);  UPPER ENDOSCOPY with Biopsy;  Surgeon: Ashley Narayanan DO;  [facility]: [facility]     ESOPHAGOSCOPY, GASTROSCOPY, DUODENOSCOPY (EGD), COMBINED N/A 10/3/2019    Procedure: UPPER ENDOSCOPY WITH BIOPSIES;  Surgeon: Lionel Rodriguez MD;  Location: [facility]     TONSILLECTOMY         Social History     Tobacco Use     Smoking status: Current Some Day Smoker     Packs/day: 0.20     Years: 15.00     Pack years: 3.00     Types: Cigarettes     Smokeless tobacco: Never Used     Tobacco comment: Longest period tobacco-free: 6 months; relapse due to social pressure; passive smoke exposure (yes)   Substance Use Topics     Alcohol use: Yes     Comment: 3 beers daily/occasionally (?)     Family History   Problem Relation Age of Onset     Cancer Maternal Grandmother         Cause of  death     Diabetes Maternal Grandmother 67     Diabetes Maternal Grandfather 70     Diabetes Father            Current Outpatient Medications   Medication Sig Dispense Refill     amLODIPine (NORVASC) 10 MG tablet TAKE 1 TABLET DAILY 90 tablet 3     aspirin 81 MG EC tablet Take 1 tablet (81 mg) by mouth daily 90 tablet 3     atenolol (TENORMIN) 25 MG tablet Take 1 tablet (25 mg) by mouth daily 90 tablet 1     ibuprofen (ADVIL/MOTRIN) 800 MG tablet TAKE 1 TABLET EVERY 8 HOURS AS NEEDED FOR MODERATE PAIN 90 tablet 12     lisinopril (ZESTRIL) 10 MG tablet Take 1 tablet (10 mg) by mouth daily 90 tablet 1     LORazepam (ATIVAN) 1 MG tablet TAKE 1 TABLET BY MOUTH DAILY AS NEEDED FOR ANXIETY 30 tablet 0     montelukast (SINGULAIR) 10 MG tablet TAKE 1 TABLET AT BEDTIME 30 tablet 5     omeprazole (PRILOSEC) 40 MG DR capsule TAKE 1 CAPSULE TWICE A DAY 90 capsule 1       No Known Allergies       Recent Labs   Lab Test 01/05/21  1037 08/30/19  0758 05/30/18  1333 05/14/18  1031   LDL 98 95  --  95   HDL 30* 40  --  32*   TRIG 190* 116  --  100   * 171* 89* 242*   CR 0.73 0.76  --  0.80   GFRESTIMATED >90 >90  --  >90   GFRESTBLACK >90 >90  --  >90   POTASSIUM 3.6 3.6  --  3.8   TSH 0.89 2.36  --  1.25        BP Readings from Last 3 Encounters:   08/03/21 110/76   01/05/21 136/78   02/06/20 122/84    Wt Readings from Last 3 Encounters:   08/03/21 112 kg (247 lb)   01/05/21 120.6 kg (265 lb 12.8 oz)   02/06/20 111.1 kg (245 lb)               Patient Active Problem List   Diagnosis     Essential hypertension, benign     GERD (gastroesophageal reflux disease)     Allergic rhinitis     Tobacco abuse     Panic disorder     Morbid obesity (H)     Past Surgical History:   Procedure Laterality Date     CIRCUMCISION       ESOPHAGOSCOPY, GASTROSCOPY, DUODENOSCOPY (EGD), COMBINED  4/22/2013    Procedure: COMBINED ESOPHAGOSCOPY, GASTROSCOPY, DUODENOSCOPY (EGD);  UPPER ENDOSCOPY with Biopsy;  Surgeon: Ashley Narayanan DO;  Location:  [facility]     ESOPHAGOSCOPY, GASTROSCOPY, DUODENOSCOPY (EGD), COMBINED N/A 10/3/2019    Procedure: UPPER ENDOSCOPY WITH BIOPSIES;  Surgeon: Lionel Rodriguez MD;  Location: [facility]     TONSILLECTOMY         Social History     Tobacco Use     Smoking status: Current Some Day Smoker     Packs/day: 0.20     Years: 15.00     Pack years: 3.00     Types: Cigarettes     Smokeless tobacco: Never Used     Tobacco comment: Longest period tobacco-free: 6 months; relapse due to social pressure; passive smoke exposure (yes)   Substance Use Topics     Alcohol use: Yes     Comment: 3 beers daily/occasionally (?)     Family History   Problem Relation Age of Onset     Cancer Maternal Grandmother         Cause of death     Diabetes Maternal Grandmother 67     Diabetes Maternal Grandfather 70     Diabetes Father            Current Outpatient Medications   Medication Sig Dispense Refill     amLODIPine (NORVASC) 10 MG tablet TAKE 1 TABLET DAILY 90 tablet 3     aspirin 81 MG EC tablet Take 1 tablet (81 mg) by mouth daily 90 tablet 3     atenolol (TENORMIN) 25 MG tablet Take 1 tablet (25 mg) by mouth daily 90 tablet 1     ibuprofen (ADVIL/MOTRIN) 800 MG tablet TAKE 1 TABLET EVERY 8 HOURS AS NEEDED FOR MODERATE PAIN 90 tablet 12     lisinopril (ZESTRIL) 10 MG tablet Take 1 tablet (10 mg) by mouth daily 90 tablet 1     LORazepam (ATIVAN) 1 MG tablet TAKE 1 TABLET BY MOUTH DAILY AS NEEDED FOR ANXIETY 30 tablet 0     montelukast (SINGULAIR) 10 MG tablet TAKE 1 TABLET AT BEDTIME 30 tablet 5     omeprazole (PRILOSEC) 40 MG DR capsule TAKE 1 CAPSULE TWICE A DAY 90 capsule 1       No Known Allergies       Recent Labs   Lab Test 01/05/21  1037 08/30/19  0758 05/30/18  1333 05/14/18  1031   LDL 98 95  --  95   HDL 30* 40  --  32*   TRIG 190* 116  --  100   * 171* 89* 242*   CR 0.73 0.76  --  0.80   GFRESTIMATED >90 >90  --  >90   GFRESTBLACK >90 >90  --  >90   POTASSIUM 3.6 3.6  --  3.8   TSH 0.89 2.36  --  1.25        BP Readings from Last 3  Encounters:   08/03/21 110/76   01/05/21 136/78   02/06/20 122/84    Wt Readings from Last 3 Encounters:   08/03/21 112 kg (247 lb)   01/05/21 120.6 kg (265 lb 12.8 oz)   02/06/20 111.1 kg (245 lb)              Review of Systems   Constitutional, HEENT, cardiovascular, pulmonary, GI, , musculoskeletal, neuro, skin, endocrine and psych systems are negative, except as otherwise noted.        Objective    /76   Wt 112 kg (247 lb)   BMI 33.50 kg/m    Body mass index is 33.5 kg/m .       Physical Exam   GENERAL: healthy, alert and no distress  EYES: Eyes grossly normal to inspection, PERRL and conjunctivae and sclerae normal  HENT: ear canals and TM's normal, nose and mouth without ulcers or lesions  NECK: no adenopathy, no asymmetry, masses, or scars and thyroid normal to palpation  RESP: lungs clear to auscultation - no rales, rhonchi or wheezes  CV: regular rate and rhythm, normal S1 S2, no S3 or S4, no murmur, click or rub, no peripheral edema and peripheral pulses strong  MS: no gross musculoskeletal defects noted, no edema  SKIN: no suspicious lesions or rashes  PSYCH: mentation appears normal, affect normal/bright

## 2021-08-03 ENCOUNTER — MYC MEDICAL ADVICE (OUTPATIENT)
Dept: FAMILY MEDICINE | Facility: OTHER | Age: 46
End: 2021-08-03

## 2021-08-03 ENCOUNTER — OFFICE VISIT (OUTPATIENT)
Dept: FAMILY MEDICINE | Facility: OTHER | Age: 46
End: 2021-08-03
Attending: NURSE PRACTITIONER
Payer: COMMERCIAL

## 2021-08-03 VITALS — DIASTOLIC BLOOD PRESSURE: 76 MMHG | BODY MASS INDEX: 33.5 KG/M2 | SYSTOLIC BLOOD PRESSURE: 110 MMHG | WEIGHT: 247 LBS

## 2021-08-03 DIAGNOSIS — F41.0 PANIC DISORDER WITHOUT AGORAPHOBIA: ICD-10-CM

## 2021-08-03 DIAGNOSIS — K21.9 GASTROESOPHAGEAL REFLUX DISEASE WITHOUT ESOPHAGITIS: ICD-10-CM

## 2021-08-03 DIAGNOSIS — I10 ESSENTIAL HYPERTENSION, BENIGN: Primary | ICD-10-CM

## 2021-08-03 LAB
ALBUMIN SERPL-MCNC: 4.1 G/DL (ref 3.4–5)
ALP SERPL-CCNC: 64 U/L (ref 40–150)
ALT SERPL W P-5'-P-CCNC: 38 U/L (ref 0–70)
ANION GAP SERPL CALCULATED.3IONS-SCNC: 8 MMOL/L (ref 3–14)
AST SERPL W P-5'-P-CCNC: 23 U/L (ref 0–45)
BILIRUB SERPL-MCNC: 0.5 MG/DL (ref 0.2–1.3)
BUN SERPL-MCNC: 14 MG/DL (ref 7–30)
CALCIUM SERPL-MCNC: 9.6 MG/DL (ref 8.5–10.1)
CHLORIDE BLD-SCNC: 109 MMOL/L (ref 94–109)
CHOLEST SERPL-MCNC: 161 MG/DL
CO2 SERPL-SCNC: 23 MMOL/L (ref 20–32)
CREAT SERPL-MCNC: 0.94 MG/DL (ref 0.66–1.25)
FASTING STATUS PATIENT QL REPORTED: YES
GFR SERPL CREATININE-BSD FRML MDRD: >90 ML/MIN/1.73M2
GLUCOSE BLD-MCNC: 101 MG/DL (ref 70–99)
HDLC SERPL-MCNC: 33 MG/DL
HOLD SPECIMEN: NORMAL
HOLD SPECIMEN: NORMAL
LDLC SERPL CALC-MCNC: 102 MG/DL
NONHDLC SERPL-MCNC: 128 MG/DL
POTASSIUM BLD-SCNC: 3.8 MMOL/L (ref 3.4–5.3)
PROT SERPL-MCNC: 8.7 G/DL (ref 6.8–8.8)
SODIUM SERPL-SCNC: 140 MMOL/L (ref 133–144)
TRIGL SERPL-MCNC: 130 MG/DL

## 2021-08-03 PROCEDURE — 36415 COLL VENOUS BLD VENIPUNCTURE: CPT | Performed by: NURSE PRACTITIONER

## 2021-08-03 PROCEDURE — 90732 PPSV23 VACC 2 YRS+ SUBQ/IM: CPT | Performed by: NURSE PRACTITIONER

## 2021-08-03 PROCEDURE — 90471 IMMUNIZATION ADMIN: CPT | Performed by: NURSE PRACTITIONER

## 2021-08-03 PROCEDURE — 99214 OFFICE O/P EST MOD 30 MIN: CPT | Mod: 25 | Performed by: NURSE PRACTITIONER

## 2021-08-03 PROCEDURE — 80053 COMPREHEN METABOLIC PANEL: CPT | Performed by: NURSE PRACTITIONER

## 2021-08-03 PROCEDURE — 80061 LIPID PANEL: CPT | Performed by: NURSE PRACTITIONER

## 2021-08-03 RX ORDER — OMEPRAZOLE 40 MG/1
CAPSULE, DELAYED RELEASE ORAL
Qty: 90 CAPSULE | Refills: 1 | Status: SHIPPED | OUTPATIENT
Start: 2021-08-03 | End: 2021-12-01

## 2021-08-03 RX ORDER — ATENOLOL 25 MG/1
25 TABLET ORAL DAILY
Qty: 90 TABLET | Refills: 1 | Status: SHIPPED | OUTPATIENT
Start: 2021-08-03 | End: 2022-02-18

## 2021-08-03 RX ORDER — LISINOPRIL 10 MG/1
10 TABLET ORAL DAILY
Qty: 90 TABLET | Refills: 1 | Status: SHIPPED | OUTPATIENT
Start: 2021-08-03 | End: 2021-12-09

## 2021-08-03 ASSESSMENT — ANXIETY QUESTIONNAIRES
2. NOT BEING ABLE TO STOP OR CONTROL WORRYING: NOT AT ALL
3. WORRYING TOO MUCH ABOUT DIFFERENT THINGS: NOT AT ALL
1. FEELING NERVOUS, ANXIOUS, OR ON EDGE: NOT AT ALL
7. FEELING AFRAID AS IF SOMETHING AWFUL MIGHT HAPPEN: NOT AT ALL
GAD7 TOTAL SCORE: 0
5. BEING SO RESTLESS THAT IT IS HARD TO SIT STILL: NOT AT ALL
6. BECOMING EASILY ANNOYED OR IRRITABLE: NOT AT ALL
IF YOU CHECKED OFF ANY PROBLEMS ON THIS QUESTIONNAIRE, HOW DIFFICULT HAVE THESE PROBLEMS MADE IT FOR YOU TO DO YOUR WORK, TAKE CARE OF THINGS AT HOME, OR GET ALONG WITH OTHER PEOPLE: NOT DIFFICULT AT ALL

## 2021-08-03 ASSESSMENT — PATIENT HEALTH QUESTIONNAIRE - PHQ9
5. POOR APPETITE OR OVEREATING: NOT AT ALL
SUM OF ALL RESPONSES TO PHQ QUESTIONS 1-9: 0

## 2021-08-03 NOTE — NURSING NOTE
Chief Complaint   Patient presents with     Hypertension     Anxiety     Gastrophageal Reflux       Initial Wt 112 kg (247 lb)   BMI 33.50 kg/m   Estimated body mass index is 33.5 kg/m  as calculated from the following:    Height as of 1/5/21: 1.829 m (6').    Weight as of this encounter: 112 kg (247 lb).  Medication Reconciliation: complete  Janett Nolen LPN

## 2021-08-03 NOTE — PATIENT INSTRUCTIONS
Assessment & Plan       Essential hypertension, benign  - Lipid Profile (Chol, Trig, HDL, LDL calc)  - Comprehensive metabolic panel  - atenolol (TENORMIN) 25 MG tablet; Take 1 tablet (25 mg) by mouth daily  - lisinopril (ZESTRIL) 10 MG tablet; Take 1 tablet (10 mg) by mouth daily    Gastroesophageal reflux disease without esophagitis  - omeprazole (PRILOSEC) 40 MG DR capsule; TAKE 1 CAPSULE TWICE A DAY    Panic disorder  - Continue plan of care        Return in about 6 months (around 2/3/2022).      Lorena Blake CNP  [facility]

## 2021-08-04 ENCOUNTER — MYC MEDICAL ADVICE (OUTPATIENT)
Dept: FAMILY MEDICINE | Facility: OTHER | Age: 46
End: 2021-08-04

## 2021-08-04 DIAGNOSIS — D64.9 LOW HEMOGLOBIN: Primary | ICD-10-CM

## 2021-08-04 ASSESSMENT — ANXIETY QUESTIONNAIRES: GAD7 TOTAL SCORE: 0

## 2021-08-16 ENCOUNTER — LAB (OUTPATIENT)
Dept: LAB | Facility: OTHER | Age: 46
End: 2021-08-16
Payer: COMMERCIAL

## 2021-08-16 DIAGNOSIS — D64.9 LOW HEMOGLOBIN: ICD-10-CM

## 2021-08-16 LAB
BASOPHILS # BLD AUTO: 0 10E3/UL (ref 0–0.2)
BASOPHILS NFR BLD AUTO: 0 %
EOSINOPHIL # BLD AUTO: 0.1 10E3/UL (ref 0–0.7)
EOSINOPHIL NFR BLD AUTO: 2 %
ERYTHROCYTE [DISTWIDTH] IN BLOOD BY AUTOMATED COUNT: 13.1 % (ref 10–15)
HCT VFR BLD AUTO: 45.4 % (ref 40–53)
HGB BLD-MCNC: 16 G/DL (ref 13.3–17.7)
LYMPHOCYTES # BLD AUTO: 2 10E3/UL (ref 0.8–5.3)
LYMPHOCYTES NFR BLD AUTO: 33 %
MCH RBC QN AUTO: 30.7 PG (ref 26.5–33)
MCHC RBC AUTO-ENTMCNC: 35.2 G/DL (ref 31.5–36.5)
MCV RBC AUTO: 87 FL (ref 78–100)
MONOCYTES # BLD AUTO: 0.6 10E3/UL (ref 0–1.3)
MONOCYTES NFR BLD AUTO: 10 %
NEUTROPHILS # BLD AUTO: 3.3 10E3/UL (ref 1.6–8.3)
NEUTROPHILS NFR BLD AUTO: 56 %
PLATELET # BLD AUTO: 197 10E3/UL (ref 150–450)
RBC # BLD AUTO: 5.21 10E6/UL (ref 4.4–5.9)
WBC # BLD AUTO: 6 10E3/UL (ref 4–11)

## 2021-08-16 PROCEDURE — 85025 COMPLETE CBC W/AUTO DIFF WBC: CPT

## 2021-08-16 PROCEDURE — 36415 COLL VENOUS BLD VENIPUNCTURE: CPT

## 2021-10-03 ENCOUNTER — HEALTH MAINTENANCE LETTER (OUTPATIENT)
Age: 46
End: 2021-10-03

## 2021-10-14 DIAGNOSIS — F41.9 ANXIETY: ICD-10-CM

## 2021-10-14 RX ORDER — LORAZEPAM 1 MG/1
TABLET ORAL
Qty: 30 TABLET | Refills: 0 | Status: SHIPPED | OUTPATIENT
Start: 2021-10-14 | End: 2021-12-27

## 2021-11-30 DIAGNOSIS — K21.9 GASTROESOPHAGEAL REFLUX DISEASE WITHOUT ESOPHAGITIS: ICD-10-CM

## 2021-12-01 RX ORDER — OMEPRAZOLE 40 MG/1
CAPSULE, DELAYED RELEASE ORAL
Qty: 180 CAPSULE | Refills: 3 | Status: SHIPPED | OUTPATIENT
Start: 2021-12-01 | End: 2022-11-25

## 2021-12-07 DIAGNOSIS — I10 ESSENTIAL HYPERTENSION, BENIGN: ICD-10-CM

## 2021-12-09 RX ORDER — LISINOPRIL 10 MG/1
TABLET ORAL
Qty: 90 TABLET | Refills: 1 | Status: SHIPPED | OUTPATIENT
Start: 2021-12-09 | End: 2022-06-07

## 2021-12-27 DIAGNOSIS — F41.9 ANXIETY: ICD-10-CM

## 2021-12-27 RX ORDER — LORAZEPAM 1 MG/1
TABLET ORAL
Qty: 30 TABLET | Refills: 0 | Status: SHIPPED | OUTPATIENT
Start: 2021-12-27 | End: 2022-05-09

## 2021-12-27 NOTE — TELEPHONE ENCOUNTER
Lorazepam       Last Written Prescription Date:  10/14/2021  Last Fill Quantity: 30,   # refills: 0  Last Office Visit: 8/3/2021  Future Office visit:       Routing refill request to provider for review/approval because:

## 2022-01-13 DIAGNOSIS — S39.012A STRAIN OF LUMBAR REGION, INITIAL ENCOUNTER: ICD-10-CM

## 2022-01-13 RX ORDER — IBUPROFEN 800 MG/1
TABLET, FILM COATED ORAL
Qty: 90 TABLET | Refills: 11 | Status: SHIPPED | OUTPATIENT
Start: 2022-01-13 | End: 2023-05-12

## 2022-02-17 DIAGNOSIS — I10 ESSENTIAL HYPERTENSION, BENIGN: ICD-10-CM

## 2022-02-18 RX ORDER — ATENOLOL 25 MG/1
TABLET ORAL
Qty: 90 TABLET | Refills: 0 | Status: SHIPPED | OUTPATIENT
Start: 2022-02-18 | End: 2022-05-20

## 2022-03-19 ENCOUNTER — HEALTH MAINTENANCE LETTER (OUTPATIENT)
Age: 47
End: 2022-03-19

## 2022-05-09 DIAGNOSIS — F41.9 ANXIETY: ICD-10-CM

## 2022-05-09 RX ORDER — LORAZEPAM 1 MG/1
TABLET ORAL
Qty: 30 TABLET | Refills: 0 | Status: SHIPPED | OUTPATIENT
Start: 2022-05-09 | End: 2022-06-20

## 2022-05-09 NOTE — TELEPHONE ENCOUNTER
Ativan       Last Written Prescription Date:  12-27-21  Last Fill Quantity: 30,   # refills: 0  Last Office Visit: 8-3-21  Future Office visit:

## 2022-06-05 DIAGNOSIS — I10 ESSENTIAL HYPERTENSION, BENIGN: ICD-10-CM

## 2022-06-07 RX ORDER — LISINOPRIL 10 MG/1
TABLET ORAL
Qty: 90 TABLET | Refills: 0 | Status: SHIPPED | OUTPATIENT
Start: 2022-06-07 | End: 2022-09-06

## 2022-06-17 NOTE — PROGRESS NOTES
Assessment & Plan        Screening for hyperlipidemia  - Lipid Profile (Chol, Trig, HDL, LDL calc)      Essential hypertension, benign  - amLODIPine (NORVASC) 10 MG tablet; Take 1 tablet (10 mg) by mouth daily  - atenolol (TENORMIN) 25 MG tablet; Take 1 tablet (25 mg) by mouth daily      Gastroesophageal reflux disease without esophagitis  - Continue plan of care      Panic disorder  - Ativan as needed      Chronic seasonal allergic rhinitis due to pollen  - montelukast (SINGULAIR) 10 MG tablet; Take 1 tablet (10 mg) by mouth At Bedtime      Lateral anterior Trapezius swelling  - CBC ordered  - Return within a few weeks if unimproved, or sooner if symptoms increase        Return in about 6 months (around 12/20/2022).      Lorena Blake, LUIS ALBERTO  [facility] - SUSANNA Toscano is a 46 year old, presenting for the following health issues:  Chronic Disease Management        Hypertension Follow-up    Do you check your blood pressure regularly outside of the clinic? No    Are you following a low salt diet? No    Are your blood pressures ever more than 140 on the top number (systolic) OR more   than 90 on the bottom number (diastolic), for example 140/90? Yes      GERD/Heartburn  Onset/Duration: prior  Accompanying Signs & Symptoms:  Does it feel like food gets stuck or trouble swallowing: no  Nausea: no  Vomiting (bloody?): no  Abdominal Pain: no  Black-Tarry stools: no  Bloody stools: no  History:  Previous ulcers: no  Therapies tried and outcome:             Medications: Omeprazole (Prilosec)      Anxiety Follow-Up    How are you doing with your anxiety since your last visit? Improved     Are you having other symptoms that might be associated with anxiety? No    Have you had a significant life event? No     Are you feeling depressed? No    Do you have any concerns with your use of alcohol or other drugs? No        Neck swelling - lateral - trapezius muscle  No distinct nodes  No other concern      Social  History     Tobacco Use     Smoking status: Current Some Day Smoker     Packs/day: 0.20     Years: 15.00     Pack years: 3.00     Types: Cigarettes     Smokeless tobacco: Never Used     Tobacco comment: Longest period tobacco-free: 6 months; relapse due to social pressure; passive smoke exposure (yes)   Substance Use Topics     Alcohol use: Yes     Comment: 3 beers daily/occasionally (?)     Drug use: No         EMELINA-7 SCORE 1/5/2021 8/3/2021 6/20/2022   Total Score 1 0 0         PHQ 1/5/2021 8/3/2021 6/20/2022   PHQ-9 Total Score 5 0 0   Q9: Thoughts of better off dead/self-harm past 2 weeks Not at all Not at all Not at all         Review of Systems   Constitutional, HEENT, cardiovascular, pulmonary, GI, , musculoskeletal, neuro, skin, endocrine and psych systems are negative, except as otherwise noted.          Objective    /78 (BP Location: Left arm, Patient Position: Sitting, Cuff Size: Adult Large)   Pulse 79   Temp 98.7  F (37.1  C) (Tympanic)   Resp 16   Wt 115.2 kg (254 lb)   SpO2 96%   BMI 34.45 kg/m    Body mass index is 34.45 kg/m .         Physical Exam   GENERAL: healthy, alert and no distress  EYES: Eyes grossly normal to inspection, PERRL and conjunctivae and sclerae normal  HENT: ear canals and TM's normal, nose and mouth without ulcers or lesions  NECK: no adenopathy, no asymmetry, masses, or scars and thyroid normal to palpation  RESP: lungs clear to auscultation - no rales, rhonchi or wheezes  CV: regular rate and rhythm, normal S1 S2, no S3 or S4, no murmur, click or rub, no peripheral edema and peripheral pulses strong  MS: Bilateral trapezius swelling, no node enlargement  SKIN: no suspicious lesions or rashes  PSYCH: mentation appears normal, affect normal/bright            .  ..

## 2022-06-20 ENCOUNTER — OFFICE VISIT (OUTPATIENT)
Dept: FAMILY MEDICINE | Facility: OTHER | Age: 47
End: 2022-06-20
Attending: NURSE PRACTITIONER
Payer: COMMERCIAL

## 2022-06-20 VITALS
WEIGHT: 254 LBS | HEART RATE: 79 BPM | RESPIRATION RATE: 16 BRPM | OXYGEN SATURATION: 96 % | TEMPERATURE: 98.7 F | BODY MASS INDEX: 34.45 KG/M2 | DIASTOLIC BLOOD PRESSURE: 78 MMHG | SYSTOLIC BLOOD PRESSURE: 118 MMHG

## 2022-06-20 DIAGNOSIS — F41.0 PANIC DISORDER WITHOUT AGORAPHOBIA: ICD-10-CM

## 2022-06-20 DIAGNOSIS — F41.9 ANXIETY: ICD-10-CM

## 2022-06-20 DIAGNOSIS — R22.1 NECK SWELLING: ICD-10-CM

## 2022-06-20 DIAGNOSIS — I10 ESSENTIAL HYPERTENSION, BENIGN: ICD-10-CM

## 2022-06-20 DIAGNOSIS — K21.9 GASTROESOPHAGEAL REFLUX DISEASE WITHOUT ESOPHAGITIS: ICD-10-CM

## 2022-06-20 DIAGNOSIS — J30.1 CHRONIC SEASONAL ALLERGIC RHINITIS DUE TO POLLEN: ICD-10-CM

## 2022-06-20 DIAGNOSIS — Z13.220 SCREENING FOR HYPERLIPIDEMIA: Primary | ICD-10-CM

## 2022-06-20 LAB
BASOPHILS # BLD AUTO: 0 10E3/UL (ref 0–0.2)
BASOPHILS NFR BLD AUTO: 0 %
CHOLEST SERPL-MCNC: 142 MG/DL
EOSINOPHIL # BLD AUTO: 0.1 10E3/UL (ref 0–0.7)
EOSINOPHIL NFR BLD AUTO: 1 %
ERYTHROCYTE [DISTWIDTH] IN BLOOD BY AUTOMATED COUNT: 13.7 % (ref 10–15)
FASTING STATUS PATIENT QL REPORTED: NO
HCT VFR BLD AUTO: 45.3 % (ref 40–53)
HDLC SERPL-MCNC: 39 MG/DL
HGB BLD-MCNC: 16.4 G/DL (ref 13.3–17.7)
LDLC SERPL CALC-MCNC: 88 MG/DL
LYMPHOCYTES # BLD AUTO: 3.3 10E3/UL (ref 0.8–5.3)
LYMPHOCYTES NFR BLD AUTO: 37 %
MCH RBC QN AUTO: 32 PG (ref 26.5–33)
MCHC RBC AUTO-ENTMCNC: 36.2 G/DL (ref 31.5–36.5)
MCV RBC AUTO: 88 FL (ref 78–100)
MONOCYTES # BLD AUTO: 0.6 10E3/UL (ref 0–1.3)
MONOCYTES NFR BLD AUTO: 6 %
NEUTROPHILS # BLD AUTO: 4.9 10E3/UL (ref 1.6–8.3)
NEUTROPHILS NFR BLD AUTO: 55 %
NONHDLC SERPL-MCNC: 103 MG/DL
PLATELET # BLD AUTO: 187 10E3/UL (ref 150–450)
RBC # BLD AUTO: 5.13 10E6/UL (ref 4.4–5.9)
TRIGL SERPL-MCNC: 74 MG/DL
WBC # BLD AUTO: 8.9 10E3/UL (ref 4–11)

## 2022-06-20 PROCEDURE — 80061 LIPID PANEL: CPT | Performed by: NURSE PRACTITIONER

## 2022-06-20 PROCEDURE — 36415 COLL VENOUS BLD VENIPUNCTURE: CPT | Performed by: NURSE PRACTITIONER

## 2022-06-20 PROCEDURE — 99214 OFFICE O/P EST MOD 30 MIN: CPT | Performed by: NURSE PRACTITIONER

## 2022-06-20 PROCEDURE — 85025 COMPLETE CBC W/AUTO DIFF WBC: CPT | Performed by: NURSE PRACTITIONER

## 2022-06-20 RX ORDER — LORAZEPAM 1 MG/1
TABLET ORAL
Qty: 30 TABLET | Refills: 3 | Status: SHIPPED | OUTPATIENT
Start: 2022-06-20 | End: 2023-10-11

## 2022-06-20 RX ORDER — ATENOLOL 25 MG/1
25 TABLET ORAL DAILY
Qty: 90 TABLET | Refills: 3 | Status: SHIPPED | OUTPATIENT
Start: 2022-06-20 | End: 2023-07-21

## 2022-06-20 RX ORDER — AMLODIPINE BESYLATE 10 MG/1
10 TABLET ORAL DAILY
Qty: 90 TABLET | Refills: 3 | Status: SHIPPED | OUTPATIENT
Start: 2022-06-20 | End: 2023-06-15

## 2022-06-20 RX ORDER — MONTELUKAST SODIUM 10 MG/1
1 TABLET ORAL AT BEDTIME
Qty: 90 TABLET | Refills: 3 | Status: SHIPPED | OUTPATIENT
Start: 2022-06-20 | End: 2023-07-27

## 2022-06-20 ASSESSMENT — PAIN SCALES - GENERAL: PAINLEVEL: NO PAIN (0)

## 2022-06-20 ASSESSMENT — ANXIETY QUESTIONNAIRES
5. BEING SO RESTLESS THAT IT IS HARD TO SIT STILL: NOT AT ALL
6. BECOMING EASILY ANNOYED OR IRRITABLE: NOT AT ALL
GAD7 TOTAL SCORE: 0
2. NOT BEING ABLE TO STOP OR CONTROL WORRYING: NOT AT ALL
GAD7 TOTAL SCORE: 0
IF YOU CHECKED OFF ANY PROBLEMS ON THIS QUESTIONNAIRE, HOW DIFFICULT HAVE THESE PROBLEMS MADE IT FOR YOU TO DO YOUR WORK, TAKE CARE OF THINGS AT HOME, OR GET ALONG WITH OTHER PEOPLE: NOT DIFFICULT AT ALL
1. FEELING NERVOUS, ANXIOUS, OR ON EDGE: NOT AT ALL
3. WORRYING TOO MUCH ABOUT DIFFERENT THINGS: NOT AT ALL
7. FEELING AFRAID AS IF SOMETHING AWFUL MIGHT HAPPEN: NOT AT ALL

## 2022-06-20 ASSESSMENT — PATIENT HEALTH QUESTIONNAIRE - PHQ9
SUM OF ALL RESPONSES TO PHQ QUESTIONS 1-9: 0
5. POOR APPETITE OR OVEREATING: NOT AT ALL

## 2022-06-20 NOTE — PATIENT INSTRUCTIONS
Assessment & Plan        Screening for hyperlipidemia  - Lipid Profile (Chol, Trig, HDL, LDL calc)      Essential hypertension, benign  - amLODIPine (NORVASC) 10 MG tablet; Take 1 tablet (10 mg) by mouth daily  - atenolol (TENORMIN) 25 MG tablet; Take 1 tablet (25 mg) by mouth daily      Gastroesophageal reflux disease without esophagitis  - Continue plan of care      Panic disorder  - Ativan as needed      Chronic seasonal allergic rhinitis due to pollen  - montelukast (SINGULAIR) 10 MG tablet; Take 1 tablet (10 mg) by mouth At Bedtime      Lateral anterior Trapezius swelling  - CBC ordered  - Return within a few weeks if unimproved, or sooner if symptoms increase        Return in about 6 months (around 12/20/2022).        Lorena Blake, CNP  [facility]

## 2022-06-20 NOTE — NURSING NOTE
Chief Complaint   Patient presents with     Chronic Disease Management       Initial /78 (BP Location: Left arm, Patient Position: Sitting, Cuff Size: Adult Large)   Pulse 79   Temp 98.7  F (37.1  C) (Tympanic)   Resp 16   Wt 115.2 kg (254 lb)   SpO2 96%   BMI 34.45 kg/m   Estimated body mass index is 34.45 kg/m  as calculated from the following:    Height as of 1/5/21: 1.829 m (6').    Weight as of this encounter: 115.2 kg (254 lb).  Medication Reconciliation: complete  Geovanna Damian LPN

## 2022-09-04 ENCOUNTER — HEALTH MAINTENANCE LETTER (OUTPATIENT)
Age: 47
End: 2022-09-04

## 2022-09-04 DIAGNOSIS — I10 ESSENTIAL HYPERTENSION, BENIGN: ICD-10-CM

## 2022-09-06 RX ORDER — LISINOPRIL 10 MG/1
TABLET ORAL
Qty: 90 TABLET | Refills: 0 | Status: SHIPPED | OUTPATIENT
Start: 2022-09-06 | End: 2022-12-06

## 2022-11-24 DIAGNOSIS — K21.9 GASTROESOPHAGEAL REFLUX DISEASE WITHOUT ESOPHAGITIS: ICD-10-CM

## 2022-11-25 RX ORDER — OMEPRAZOLE 40 MG/1
CAPSULE, DELAYED RELEASE ORAL
Qty: 180 CAPSULE | Refills: 3 | Status: SHIPPED | OUTPATIENT
Start: 2022-11-25 | End: 2023-10-11

## 2022-12-04 DIAGNOSIS — I10 ESSENTIAL HYPERTENSION, BENIGN: ICD-10-CM

## 2022-12-06 RX ORDER — LISINOPRIL 10 MG/1
TABLET ORAL
Qty: 90 TABLET | Refills: 0 | Status: SHIPPED | OUTPATIENT
Start: 2022-12-06 | End: 2023-03-06

## 2022-12-06 NOTE — TELEPHONE ENCOUNTER
Lisinopril      Last Written Prescription Date:  9.7.22  Last Fill Quantity: #90,   # refills: 0  Last Office Visit: 6.20.22  Future Office visit:    Next 5 appointments (look out 90 days)    Dec 30, 2022 11:00 AM  (Arrive by 10:45 AM)  SHORT with Lorena Blake CNP  [facility] ([facility] ) [address]  [facility] [address]  [phone]           Routing refill request to provider for review/approval because:

## 2023-03-06 DIAGNOSIS — I10 ESSENTIAL HYPERTENSION, BENIGN: ICD-10-CM

## 2023-03-06 RX ORDER — LISINOPRIL 10 MG/1
TABLET ORAL
Qty: 90 TABLET | Refills: 3 | Status: SHIPPED | OUTPATIENT
Start: 2023-03-06 | End: 2023-10-11

## 2023-03-06 NOTE — TELEPHONE ENCOUNTER
Lisinopril 10mg      Last Written Prescription Date:  12/6/22  Last Fill Quantity: 90,   # refills: 0  Last Office Visit: 6/20/22  Future Office visit:    Next 5 appointments (look out 90 days)    Mar 15, 2023  9:00 AM  (Arrive by 8:45 AM)  SHORT with Lorena Blaek CNP  [facility] ([facility] ) [address]  [place] [address]  [phone]           Routing refill request to provider for review/approval because:

## 2023-03-14 NOTE — PROGRESS NOTES
Assessment & Plan          Essential hypertension, benign  - Comprehensive metabolic panel; Future  - TSH with free T4 reflex; Future        Screening for hyperlipidemia  - Lipid Profile (Chol, Trig, HDL, LDL calc); Future        Screen for colon cancer  - COLOGUARD([employer]); Future        Return in about 6 months (around 9/15/2023).        Lorena Blake CNP  [facility] - SUSANNA Toscano is a 47 year old, presenting for the following health issues:  Chronic Disease Management          Hypertension Follow-up    Do you check your blood pressure regularly outside of the clinic? Yes     Are you following a low salt diet? Yes    Are your blood pressures ever more than 140 on the top number (systolic) OR more   than 90 on the bottom number (diastolic), for example 140/90? No         Low heart rate, low BP - dizzy a times  Has been working out and working on diet         GERD/Heartburn  Onset/Duration: prior  Accompanying Signs & Symptoms:  Does it feel like food gets stuck or trouble swallowing: No  Nausea: No  Vomiting (bloody?): No  Abdominal Pain: No  Black-Tarry stools: No  Bloody stools: No  History:  Previous ulcers: No  Therapies tried and outcome:             Medications: Omeprazole (Prilosec)          Patient Active Problem List   Diagnosis     Essential hypertension, benign     GERD (gastroesophageal reflux disease)     Allergic rhinitis     Tobacco abuse     Panic disorder     Morbid obesity (H)     Past Surgical History:   Procedure Laterality Date     CIRCUMCISION       ESOPHAGOSCOPY, GASTROSCOPY, DUODENOSCOPY (EGD), COMBINED  4/22/2013    Procedure: COMBINED ESOPHAGOSCOPY, GASTROSCOPY, DUODENOSCOPY (EGD);  UPPER ENDOSCOPY with Biopsy;  Surgeon: Ashley Narayanan DO;  Location: [facility]     ESOPHAGOSCOPY, GASTROSCOPY, DUODENOSCOPY (EGD), COMBINED N/A 10/3/2019    Procedure: UPPER ENDOSCOPY WITH BIOPSIES;  Surgeon: Lionel Rodriguez MD;  Location: [facility]     TONSILLECTOMY          Social History     Tobacco Use     Smoking status: Some Days     Packs/day: 0.20     Years: 15.00     Pack years: 3.00     Types: Cigarettes     Smokeless tobacco: Never     Tobacco comments:     Longest period tobacco-free: 6 months; relapse due to social pressure; passive smoke exposure (yes)   Substance Use Topics     Alcohol use: Yes     Comment: 3 beers daily/occasionally (?)     Family History   Problem Relation Age of Onset     Cancer Maternal Grandmother         Cause of death     Diabetes Maternal Grandmother 67     Diabetes Maternal Grandfather 70     Diabetes Father            Current Outpatient Medications   Medication Sig Dispense Refill     amLODIPine (NORVASC) 10 MG tablet Take 1 tablet (10 mg) by mouth daily 90 tablet 3     aspirin 81 MG EC tablet Take 1 tablet (81 mg) by mouth daily 90 tablet 3     atenolol (TENORMIN) 25 MG tablet Take 1 tablet (25 mg) by mouth daily 90 tablet 3     ibuprofen (ADVIL/MOTRIN) 800 MG tablet TAKE 1 TABLET EVERY 8 HOURS AS NEEDED FOR MODERATE PAIN 90 tablet 11     lisinopril (ZESTRIL) 10 MG tablet TAKE 1 TABLET DAILY 90 tablet 3     LORazepam (ATIVAN) 1 MG tablet TAKE 1 TABLET BY MOUTH DAILY AS NEEDED FOR ANXIETY 30 tablet 3     montelukast (SINGULAIR) 10 MG tablet Take 1 tablet (10 mg) by mouth At Bedtime 90 tablet 3     omeprazole (PRILOSEC) 40 MG DR capsule TAKE 1 CAPSULE TWICE A  capsule 3         No Known Allergies       Recent Labs   Lab Test 06/20/22  1359 08/03/21  0833 01/05/21  1037 08/30/19  0758   LDL 88 102* 98 95   HDL 39* 33* 30* 40   TRIG 74 130 190* 116   ALT  --  38 254* 171*   CR  --  0.94 0.73 0.76   GFRESTIMATED  --  >90 >90 >90   GFRESTBLACK  --   --  >90 >90   POTASSIUM  --  3.8 3.6 3.6   TSH  --   --  0.89 2.36        BP Readings from Last 3 Encounters:   03/15/23 112/66   06/20/22 118/78   08/03/21 110/76    Wt Readings from Last 3 Encounters:   03/15/23 108.3 kg (238 lb 11.2 oz)   06/20/22 115.2 kg (254 lb)   08/03/21 112 kg (247 lb)                 Review of Systems   Constitutional, HEENT, cardiovascular, pulmonary, gi and gu systems are negative, except as otherwise noted.        Objective    /66 (BP Location: Left arm, Patient Position: Sitting, Cuff Size: Adult Large)   Pulse 68   Temp 98  F (36.7  C) (Tympanic)   Resp 16   Wt 108.3 kg (238 lb 11.2 oz)   SpO2 95%   BMI 32.37 kg/m    Body mass index is 32.37 kg/m .           Physical Exam   GENERAL: healthy, alert and no distress  EYES: Eyes grossly normal to inspection, PERRL and conjunctivae and sclerae normal  HENT: ear canals and TM's normal, nose and mouth without ulcers or lesions  NECK: no adenopathy, no asymmetry, masses, or scars and thyroid normal to palpation  RESP: lungs clear to auscultation - no rales, rhonchi or wheezes  CV: regular rate and rhythm, normal S1 S2, no S3 or S4, no murmur, click or rub, no peripheral edema and peripheral pulses strong  SKIN: no suspicious lesions or rashes  PSYCH: mentation appears normal, affect normal/bright

## 2023-03-15 ENCOUNTER — OFFICE VISIT (OUTPATIENT)
Dept: FAMILY MEDICINE | Facility: OTHER | Age: 48
End: 2023-03-15
Attending: NURSE PRACTITIONER
Payer: COMMERCIAL

## 2023-03-15 VITALS
DIASTOLIC BLOOD PRESSURE: 66 MMHG | TEMPERATURE: 98 F | HEART RATE: 68 BPM | BODY MASS INDEX: 32.37 KG/M2 | WEIGHT: 238.7 LBS | OXYGEN SATURATION: 95 % | SYSTOLIC BLOOD PRESSURE: 112 MMHG | RESPIRATION RATE: 16 BRPM

## 2023-03-15 DIAGNOSIS — Z13.220 SCREENING FOR HYPERLIPIDEMIA: ICD-10-CM

## 2023-03-15 DIAGNOSIS — I10 ESSENTIAL HYPERTENSION, BENIGN: Primary | ICD-10-CM

## 2023-03-15 DIAGNOSIS — Z12.11 SCREEN FOR COLON CANCER: ICD-10-CM

## 2023-03-15 LAB
ALBUMIN SERPL BCG-MCNC: 4.3 G/DL (ref 3.5–5.2)
ALP SERPL-CCNC: 76 U/L (ref 40–129)
ALT SERPL W P-5'-P-CCNC: 19 U/L (ref 10–50)
ANION GAP SERPL CALCULATED.3IONS-SCNC: 11 MMOL/L (ref 7–15)
AST SERPL W P-5'-P-CCNC: 22 U/L (ref 10–50)
BILIRUB SERPL-MCNC: 0.6 MG/DL
BUN SERPL-MCNC: 15.4 MG/DL (ref 6–20)
CALCIUM SERPL-MCNC: 9.3 MG/DL (ref 8.6–10)
CHLORIDE SERPL-SCNC: 106 MMOL/L (ref 98–107)
CHOLEST SERPL-MCNC: 119 MG/DL
CREAT SERPL-MCNC: 0.91 MG/DL (ref 0.67–1.17)
DEPRECATED HCO3 PLAS-SCNC: 25 MMOL/L (ref 22–29)
GFR SERPL CREATININE-BSD FRML MDRD: >90 ML/MIN/1.73M2
GLUCOSE SERPL-MCNC: 101 MG/DL (ref 70–99)
HDLC SERPL-MCNC: 34 MG/DL
HOLD SPECIMEN: NORMAL
HOLD SPECIMEN: NORMAL
LDLC SERPL CALC-MCNC: 65 MG/DL
NONHDLC SERPL-MCNC: 85 MG/DL
POTASSIUM SERPL-SCNC: 3.8 MMOL/L (ref 3.4–5.3)
PROT SERPL-MCNC: 7.8 G/DL (ref 6.4–8.3)
SODIUM SERPL-SCNC: 142 MMOL/L (ref 136–145)
TRIGL SERPL-MCNC: 102 MG/DL
TSH SERPL DL<=0.005 MIU/L-ACNC: 1.74 UIU/ML (ref 0.3–4.2)

## 2023-03-15 PROCEDURE — 99213 OFFICE O/P EST LOW 20 MIN: CPT | Mod: 25 | Performed by: NURSE PRACTITIONER

## 2023-03-15 PROCEDURE — 90715 TDAP VACCINE 7 YRS/> IM: CPT | Performed by: NURSE PRACTITIONER

## 2023-03-15 PROCEDURE — 84443 ASSAY THYROID STIM HORMONE: CPT | Performed by: NURSE PRACTITIONER

## 2023-03-15 PROCEDURE — 80053 COMPREHEN METABOLIC PANEL: CPT | Performed by: NURSE PRACTITIONER

## 2023-03-15 PROCEDURE — 80061 LIPID PANEL: CPT | Performed by: NURSE PRACTITIONER

## 2023-03-15 PROCEDURE — 36415 COLL VENOUS BLD VENIPUNCTURE: CPT | Performed by: NURSE PRACTITIONER

## 2023-03-15 PROCEDURE — 90471 IMMUNIZATION ADMIN: CPT | Performed by: NURSE PRACTITIONER

## 2023-03-15 RX ORDER — LISINOPRIL 10 MG/1
10 TABLET ORAL DAILY
Qty: 90 TABLET | Refills: 3 | Status: CANCELLED | OUTPATIENT
Start: 2023-03-15

## 2023-03-15 ASSESSMENT — ANXIETY QUESTIONNAIRES
1. FEELING NERVOUS, ANXIOUS, OR ON EDGE: NOT AT ALL
5. BEING SO RESTLESS THAT IT IS HARD TO SIT STILL: NOT AT ALL
7. FEELING AFRAID AS IF SOMETHING AWFUL MIGHT HAPPEN: NOT AT ALL
2. NOT BEING ABLE TO STOP OR CONTROL WORRYING: NOT AT ALL
6. BECOMING EASILY ANNOYED OR IRRITABLE: NOT AT ALL
GAD7 TOTAL SCORE: 0
3. WORRYING TOO MUCH ABOUT DIFFERENT THINGS: NOT AT ALL
IF YOU CHECKED OFF ANY PROBLEMS ON THIS QUESTIONNAIRE, HOW DIFFICULT HAVE THESE PROBLEMS MADE IT FOR YOU TO DO YOUR WORK, TAKE CARE OF THINGS AT HOME, OR GET ALONG WITH OTHER PEOPLE: NOT DIFFICULT AT ALL
GAD7 TOTAL SCORE: 0

## 2023-03-15 ASSESSMENT — PATIENT HEALTH QUESTIONNAIRE - PHQ9
5. POOR APPETITE OR OVEREATING: NOT AT ALL
SUM OF ALL RESPONSES TO PHQ QUESTIONS 1-9: 0

## 2023-03-15 ASSESSMENT — PAIN SCALES - GENERAL: PAINLEVEL: NO PAIN (0)

## 2023-03-15 NOTE — PATIENT INSTRUCTIONS
Assessment & Plan          Essential hypertension, benign  - Comprehensive metabolic panel; Future  - TSH with free T4 reflex; Future        Screening for hyperlipidemia  - Lipid Profile (Chol, Trig, HDL, LDL calc); Future        Screen for colon cancer  - COLOGUARD([employer]); Future        Return in about 6 months (around 9/15/2023).        Lorena Blake, CNP  [facility]

## 2023-04-10 DIAGNOSIS — Z12.11 SCREEN FOR COLON CANCER: ICD-10-CM

## 2023-04-29 ENCOUNTER — HEALTH MAINTENANCE LETTER (OUTPATIENT)
Age: 48
End: 2023-04-29

## 2023-06-14 DIAGNOSIS — I10 ESSENTIAL HYPERTENSION, BENIGN: ICD-10-CM

## 2023-06-15 RX ORDER — AMLODIPINE BESYLATE 10 MG/1
TABLET ORAL
Qty: 90 TABLET | Refills: 2 | Status: SHIPPED | OUTPATIENT
Start: 2023-06-15 | End: 2023-10-11

## 2023-06-15 NOTE — TELEPHONE ENCOUNTER
Amlodipine (Norvasc) 10 MG tablet     Last Written Prescription Date:  06/20/2022  Last Fill Quantity: 90,   # refills: 3  Last Office Visit: 03/15/2023

## 2023-07-25 DIAGNOSIS — S39.012A STRAIN OF LUMBAR REGION, INITIAL ENCOUNTER: ICD-10-CM

## 2023-07-25 DIAGNOSIS — J30.1 CHRONIC SEASONAL ALLERGIC RHINITIS DUE TO POLLEN: ICD-10-CM

## 2023-07-27 RX ORDER — MONTELUKAST SODIUM 10 MG/1
TABLET ORAL
Qty: 90 TABLET | Refills: 1 | Status: SHIPPED | OUTPATIENT
Start: 2023-07-27 | End: 2023-10-11

## 2023-07-27 RX ORDER — IBUPROFEN 800 MG/1
TABLET, FILM COATED ORAL
Qty: 90 TABLET | Refills: 4 | Status: SHIPPED | OUTPATIENT
Start: 2023-07-27 | End: 2023-10-11

## 2023-07-27 NOTE — TELEPHONE ENCOUNTER
Ibuprofen      Last Written Prescription Date:  5/12/23  Last Fill Quantity: 90,   # refills: 0  Last Office Visit: 3/15/23  Future Office visit:    Next 5 appointments (look out 90 days)      Sep 26, 2023  8:30 AM  (Arrive by 8:15 AM)  SHORT with Lorena Blake CNP  [facility] ([facility] ) [address] [place] [address]  [phone]             Routing refill request to provider for review/approval because:      Singulair      Last Written Prescription Date:  6/20/22  Last Fill Quantity: 90,   # refills: 3  Last Office Visit: 3/15/23  Future Office visit:    Next 5 appointments (look out 90 days)      Sep 26, 2023  8:30 AM  (Arrive by 8:15 AM)  SHORT with Lorena Blake CNP  [facility] ([facility] ) [address] [place] [address]  [phone]             Routing refill request to provider for review/approval because:

## 2023-10-10 NOTE — PROGRESS NOTES
Assessment & Plan       Essential hypertension, benign  - Comprehensive metabolic panel; Future  - lisinopril (ZESTRIL) 10 MG tablet; Take 1 tablet (10 mg) by mouth daily  - atenolol (TENORMIN) 25 MG tablet; Take 1 tablet (25 mg) by mouth daily  - amLODIPine (NORVASC) 10 MG tablet; Take 1 tablet (10 mg) by mouth daily    Gastroesophageal reflux disease without esophagitis  - omeprazole (PRILOSEC) 40 MG DR capsule; TAKE 1 CAPSULE TWICE A DAY    Panic disorder  - LORazepam (ATIVAN) 1 MG tablet; TAKE 1 TABLET BY MOUTH DAILY AS NEEDED FOR ANXIETY    Chronic seasonal allergic rhinitis due to pollen  - montelukast (SINGULAIR) 10 MG tablet; Take 1 tablet (10 mg) by mouth at bedtime    Strain of lumbar region, initial encounter  - ibuprofen (ADVIL/MOTRIN) 800 MG tablet; TAKE 1 TABLET EVERY 8 HOURS AS NEEDED FOR MODERATE PAIN        When your lab results return, we will call you with abnormal findings  You can also view this information in your MyChart, if you have an account.  Please call or [email or web address] message us with any questions you may have.        Return in about 6 months (around 4/11/2024).      Lorena Blake, [facility] - SUSANNA Toscano is a 48 year old, presenting for the following health issues:  Chronic Disease Management        Hypertension Follow-up  Do you check your blood pressure regularly outside of the clinic? Yes   Are you following a low salt diet? No  Are your blood pressures ever more than 140 on the top number (systolic) OR more   than 90 on the bottom number (diastolic), for example 140/90? No        GERD/Heartburn  Onset/Duration: prior  Accompanying Signs & Symptoms:  Does it feel like food gets stuck or trouble swallowing: No  Nausea: No  Vomiting (bloody?): No  Abdominal Pain: No  Black-Tarry stools: No  Bloody stools: No  History:  Previous ulcers: No  Therapies tried and outcome:             Medications: Omeprazole (Prilosec)        Panic attack - Follow-up   How are you  doing with your anxiety since your last visit? Improved   Are you having other symptoms that might be associated with anxiety? No  Have you had a significant life event? No   Are you feeling depressed? No  Do you have any concerns with your use of alcohol or other drugs? No          Social History     Tobacco Use    Smoking status: Former     Packs/day: 0.20     Years: 15.00     Additional pack years: 0.00     Total pack years: 3.00     Types: Cigarettes     Quit date: 10/1/2022     Years since quittin.0    Smokeless tobacco: Never    Tobacco comments:     Longest period tobacco-free: 6 months; relapse due to social pressure; passive smoke exposure (yes)   Vaping Use    Vaping Use: Never used   Substance Use Topics    Alcohol use: Yes     Comment: 3 beers daily/occasionally (?)    Drug use: No               8/3/2021     8:59 AM 2022     2:08 PM 3/15/2023     9:05 AM   EMELINA-7 SCORE   Total Score 0 0 0             8/3/2021     8:59 AM 2022     2:08 PM 3/15/2023     9:05 AM   PHQ   PHQ-9 Total Score 0 0 0   Q9: Thoughts of better off dead/self-harm past 2 weeks Not at all Not at all Not at all         Patient Active Problem List   Diagnosis    Essential hypertension, benign    GERD (gastroesophageal reflux disease)    Allergic rhinitis    Panic disorder    Morbid obesity (H)     Past Surgical History:   Procedure Laterality Date    CIRCUMCISION      ESOPHAGOSCOPY, GASTROSCOPY, DUODENOSCOPY (EGD), COMBINED  2013    Procedure: COMBINED ESOPHAGOSCOPY, GASTROSCOPY, DUODENOSCOPY (EGD);  UPPER ENDOSCOPY with Biopsy;  Surgeon: Ashley Narayanan DO;  Location: [facility]    ESOPHAGOSCOPY, GASTROSCOPY, DUODENOSCOPY (EGD), COMBINED N/A 10/3/2019    Procedure: UPPER ENDOSCOPY WITH BIOPSIES;  Surgeon: Lionel Rodriguez MD;  Location: [facility]    TONSILLECTOMY         Social History     Tobacco Use    Smoking status: Former     Packs/day: 0.20     Years: 15.00     Additional pack years: 0.00     Total pack years:  3.00     Types: Cigarettes     Quit date: 10/1/2022     Years since quittin.0    Smokeless tobacco: Never    Tobacco comments:     Longest period tobacco-free: 6 months; relapse due to social pressure; passive smoke exposure (yes)   Substance Use Topics    Alcohol use: Yes     Comment: 3 beers daily/occasionally (?)     Family History   Problem Relation Age of Onset    Cancer Maternal Grandmother         Cause of death    Diabetes Maternal Grandmother 67    Diabetes Maternal Grandfather 70    Diabetes Father            Current Outpatient Medications   Medication Sig Dispense Refill    amLODIPine (NORVASC) 10 MG tablet Take 1 tablet (10 mg) by mouth daily 90 tablet 2    aspirin 81 MG EC tablet Take 1 tablet (81 mg) by mouth daily 90 tablet 3    atenolol (TENORMIN) 25 MG tablet Take 1 tablet (25 mg) by mouth daily 90 tablet 1    ibuprofen (ADVIL/MOTRIN) 800 MG tablet TAKE 1 TABLET EVERY 8 HOURS AS NEEDED FOR MODERATE PAIN 90 tablet 4    lisinopril (ZESTRIL) 10 MG tablet Take 1 tablet (10 mg) by mouth daily 90 tablet 3    LORazepam (ATIVAN) 1 MG tablet TAKE 1 TABLET BY MOUTH DAILY AS NEEDED FOR ANXIETY 30 tablet 3    montelukast (SINGULAIR) 10 MG tablet Take 1 tablet (10 mg) by mouth at bedtime 90 tablet 1    omeprazole (PRILOSEC) 40 MG DR capsule TAKE 1 CAPSULE TWICE A  capsule 3         No Known Allergies        Recent Labs   Lab Test 03/15/23  0913 22  1359 21  0833 21  1037 19  0758   LDL 65 88 102* 98 95   HDL 34* 39* 33* 30* 40   TRIG 102 74 130 190* 116   ALT 19  --  38 254* 171*   CR 0.91  --  0.94 0.73 0.76   GFRESTIMATED >90  --  >90 >90 >90   GFRESTBLACK  --   --   --  >90 >90   POTASSIUM 3.8  --  3.8 3.6 3.6   TSH 1.74  --   --  0.89 2.36            BP Readings from Last 3 Encounters:   10/11/23 132/72   03/15/23 112/66   22 118/78    Wt Readings from Last 3 Encounters:   10/11/23 114.4 kg (252 lb 3.2 oz)   03/15/23 108.3 kg (238 lb 11.2 oz)   22 115.2 kg (254  lb)                Review of Systems   Constitutional, HEENT, cardiovascular, pulmonary, GI, , musculoskeletal, neuro, skin, endocrine and psych systems are negative, except as otherwise noted.          Objective    /72 (BP Location: Left arm, Patient Position: Sitting, Cuff Size: Adult Large)   Pulse 70   Temp 98.1  F (36.7  C) (Tympanic)   Resp 18   Wt 114.4 kg (252 lb 3.2 oz)   SpO2 95%   BMI 34.20 kg/m    Body mass index is 34.2 kg/m .        Physical Exam   GENERAL: healthy, alert and no distress  EYES: Eyes grossly normal to inspection, PERRL and conjunctivae and sclerae normal  HENT: ear canals and TM's normal, nose and mouth without ulcers or lesions  NECK: no adenopathy, no asymmetry, masses, or scars and thyroid normal to palpation  RESP: lungs clear to auscultation - no rales, rhonchi or wheezes  CV: regular rate and rhythm, normal S1 S2, no S3 or S4, no murmur, click or rub, no peripheral edema and peripheral pulses strong  MS: no gross musculoskeletal defects noted, no edema  SKIN: no suspicious lesions or rashes  PSYCH: mentation appears normal, affect normal/bright

## 2023-10-11 ENCOUNTER — OFFICE VISIT (OUTPATIENT)
Dept: FAMILY MEDICINE | Facility: OTHER | Age: 48
End: 2023-10-11
Attending: NURSE PRACTITIONER
Payer: COMMERCIAL

## 2023-10-11 VITALS
WEIGHT: 252.2 LBS | HEART RATE: 70 BPM | RESPIRATION RATE: 18 BRPM | OXYGEN SATURATION: 95 % | TEMPERATURE: 98.1 F | DIASTOLIC BLOOD PRESSURE: 72 MMHG | SYSTOLIC BLOOD PRESSURE: 132 MMHG | BODY MASS INDEX: 34.2 KG/M2

## 2023-10-11 DIAGNOSIS — S39.012A STRAIN OF LUMBAR REGION, INITIAL ENCOUNTER: ICD-10-CM

## 2023-10-11 DIAGNOSIS — J30.1 CHRONIC SEASONAL ALLERGIC RHINITIS DUE TO POLLEN: ICD-10-CM

## 2023-10-11 DIAGNOSIS — I10 ESSENTIAL HYPERTENSION, BENIGN: Primary | ICD-10-CM

## 2023-10-11 DIAGNOSIS — K21.9 GASTROESOPHAGEAL REFLUX DISEASE WITHOUT ESOPHAGITIS: ICD-10-CM

## 2023-10-11 DIAGNOSIS — F41.0 PANIC DISORDER WITHOUT AGORAPHOBIA: ICD-10-CM

## 2023-10-11 LAB
ALBUMIN SERPL BCG-MCNC: 4.6 G/DL (ref 3.5–5.2)
ALP SERPL-CCNC: 66 U/L (ref 40–129)
ALT SERPL W P-5'-P-CCNC: 21 U/L (ref 0–70)
ANION GAP SERPL CALCULATED.3IONS-SCNC: 12 MMOL/L (ref 7–15)
AST SERPL W P-5'-P-CCNC: 22 U/L (ref 0–45)
BILIRUB SERPL-MCNC: 0.3 MG/DL
BUN SERPL-MCNC: 15.5 MG/DL (ref 6–20)
CALCIUM SERPL-MCNC: 9.2 MG/DL (ref 8.6–10)
CHLORIDE SERPL-SCNC: 104 MMOL/L (ref 98–107)
CREAT SERPL-MCNC: 0.93 MG/DL (ref 0.67–1.17)
DEPRECATED HCO3 PLAS-SCNC: 25 MMOL/L (ref 22–29)
EGFRCR SERPLBLD CKD-EPI 2021: >90 ML/MIN/1.73M2
GLUCOSE SERPL-MCNC: 101 MG/DL (ref 70–99)
HOLD SPECIMEN: NORMAL
POTASSIUM SERPL-SCNC: 3.9 MMOL/L (ref 3.4–5.3)
PROT SERPL-MCNC: 7.8 G/DL (ref 6.4–8.3)
SODIUM SERPL-SCNC: 141 MMOL/L (ref 135–145)

## 2023-10-11 PROCEDURE — 36415 COLL VENOUS BLD VENIPUNCTURE: CPT | Performed by: NURSE PRACTITIONER

## 2023-10-11 PROCEDURE — 90471 IMMUNIZATION ADMIN: CPT | Performed by: NURSE PRACTITIONER

## 2023-10-11 PROCEDURE — 90686 IIV4 VACC NO PRSV 0.5 ML IM: CPT | Performed by: NURSE PRACTITIONER

## 2023-10-11 PROCEDURE — 80053 COMPREHEN METABOLIC PANEL: CPT | Performed by: NURSE PRACTITIONER

## 2023-10-11 PROCEDURE — 99214 OFFICE O/P EST MOD 30 MIN: CPT | Mod: 25 | Performed by: NURSE PRACTITIONER

## 2023-10-11 RX ORDER — IBUPROFEN 800 MG/1
TABLET, FILM COATED ORAL
Qty: 90 TABLET | Refills: 4 | Status: SHIPPED | OUTPATIENT
Start: 2023-10-11

## 2023-10-11 RX ORDER — MONTELUKAST SODIUM 10 MG/1
1 TABLET ORAL AT BEDTIME
Qty: 90 TABLET | Refills: 1 | Status: SHIPPED | OUTPATIENT
Start: 2023-10-11 | End: 2024-04-11

## 2023-10-11 RX ORDER — LORAZEPAM 1 MG/1
TABLET ORAL
Qty: 30 TABLET | Refills: 3 | Status: SHIPPED | OUTPATIENT
Start: 2023-10-11

## 2023-10-11 RX ORDER — OMEPRAZOLE 40 MG/1
CAPSULE, DELAYED RELEASE ORAL
Qty: 180 CAPSULE | Refills: 3 | Status: SHIPPED | OUTPATIENT
Start: 2023-10-11

## 2023-10-11 RX ORDER — ATENOLOL 25 MG/1
25 TABLET ORAL DAILY
Qty: 90 TABLET | Refills: 1 | Status: SHIPPED | OUTPATIENT
Start: 2023-10-11 | End: 2024-04-11

## 2023-10-11 RX ORDER — LISINOPRIL 10 MG/1
10 TABLET ORAL DAILY
Qty: 90 TABLET | Refills: 3 | Status: SHIPPED | OUTPATIENT
Start: 2023-10-11

## 2023-10-11 RX ORDER — AMLODIPINE BESYLATE 10 MG/1
10 TABLET ORAL DAILY
Qty: 90 TABLET | Refills: 2 | Status: SHIPPED | OUTPATIENT
Start: 2023-10-11 | End: 2024-08-15

## 2023-10-11 ASSESSMENT — PAIN SCALES - GENERAL: PAINLEVEL: NO PAIN (0)

## 2023-10-11 NOTE — PATIENT INSTRUCTIONS
Assessment & Plan       Essential hypertension, benign  - Comprehensive metabolic panel; Future  - lisinopril (ZESTRIL) 10 MG tablet; Take 1 tablet (10 mg) by mouth daily  - atenolol (TENORMIN) 25 MG tablet; Take 1 tablet (25 mg) by mouth daily  - amLODIPine (NORVASC) 10 MG tablet; Take 1 tablet (10 mg) by mouth daily    Gastroesophageal reflux disease without esophagitis  - omeprazole (PRILOSEC) 40 MG DR capsule; TAKE 1 CAPSULE TWICE A DAY    Panic disorder  - LORazepam (ATIVAN) 1 MG tablet; TAKE 1 TABLET BY MOUTH DAILY AS NEEDED FOR ANXIETY    Chronic seasonal allergic rhinitis due to pollen  - montelukast (SINGULAIR) 10 MG tablet; Take 1 tablet (10 mg) by mouth at bedtime    Strain of lumbar region, initial encounter  - ibuprofen (ADVIL/MOTRIN) 800 MG tablet; TAKE 1 TABLET EVERY 8 HOURS AS NEEDED FOR MODERATE PAIN        When your lab results return, we will call you with abnormal findings  You can also view this information in your MyChart, if you have an account.  Please call or [email or web address] message us with any questions you may have.        Return in about 6 months (around 4/11/2024).      Lorena Blake, LUIS ALBERTO  [facility]

## 2024-04-10 DIAGNOSIS — I10 ESSENTIAL HYPERTENSION, BENIGN: ICD-10-CM

## 2024-04-10 DIAGNOSIS — J30.1 CHRONIC SEASONAL ALLERGIC RHINITIS DUE TO POLLEN: ICD-10-CM

## 2024-04-11 RX ORDER — MONTELUKAST SODIUM 10 MG/1
1 TABLET ORAL AT BEDTIME
Qty: 90 TABLET | Refills: 3 | Status: SHIPPED | OUTPATIENT
Start: 2024-04-11

## 2024-04-11 RX ORDER — ATENOLOL 25 MG/1
25 TABLET ORAL DAILY
Qty: 90 TABLET | Refills: 3 | Status: SHIPPED | OUTPATIENT
Start: 2024-04-11

## 2024-07-07 ENCOUNTER — HEALTH MAINTENANCE LETTER (OUTPATIENT)
Age: 49
End: 2024-07-07

## 2024-08-14 DIAGNOSIS — I10 ESSENTIAL HYPERTENSION, BENIGN: ICD-10-CM

## 2024-08-15 RX ORDER — AMLODIPINE BESYLATE 10 MG/1
10 TABLET ORAL DAILY
Qty: 90 TABLET | Refills: 0 | Status: SHIPPED | OUTPATIENT
Start: 2024-08-15

## 2024-10-21 DIAGNOSIS — K21.9 GASTROESOPHAGEAL REFLUX DISEASE WITHOUT ESOPHAGITIS: ICD-10-CM

## 2024-10-22 NOTE — TELEPHONE ENCOUNTER
Please schedule Yearly Preventative Visit and return to refill pool.    OMEPRAZOLE DR LOCKHART 40MG         Last Written Prescription Date:  10/11/23  Last Fill Quantity: 180,   # refills: 3  Last Office Visit: 10/11/23  Future Office visit:       Routing refill request to provider for review/approval because:  PPI Protocol [id number]/21/2024 11:48 PM   Protocol Details Recent (12 mo) or future (90 days) visit within the authorizing provider's specialty

## 2024-10-22 NOTE — TELEPHONE ENCOUNTER
Attempt # 1  Outcome: Left Message   Comment: LVM for patient to call to schedule preventative with PCP.

## 2024-10-24 NOTE — TELEPHONE ENCOUNTER
Attempt # 2  Outcome: Left Message   Comment: LVM for patient to call to schedule a physical/med review with PCP.

## 2024-10-28 RX ORDER — OMEPRAZOLE 40 MG/1
CAPSULE, DELAYED RELEASE ORAL
Qty: 180 CAPSULE | Refills: 0 | Status: SHIPPED | OUTPATIENT
Start: 2024-10-28

## 2024-11-04 DIAGNOSIS — I10 ESSENTIAL HYPERTENSION, BENIGN: ICD-10-CM

## 2024-11-04 NOTE — TELEPHONE ENCOUNTER
LISINOPRIL TABS 10MG       Last Written Prescription Date:  10/11/23  Last Fill Quantity: 90,   # refills: 3  Last Office Visit: 10/11/23  Future Office visit:    Next 5 appointments (look out 90 days)      Nov 19, 2024 2:00 PM  (Arrive by 1:45 PM)  Adult Preventative Visit with Lorena Blake CNP  [facility] ([facility] ) [address]  [facility] [address]  [phone]             Routing refill request to provider for review/approval because:  ACE Inhibitors (Including Combos) Protocol [id number]/04/2024 12:23 AM   Protocol Details   Most recent blood pressure under 140/90 in past 12 months- Clinicial or Patient Reported    Recent (12 mo) or future (90 days) visit within the authorizing provider's specialty    Most recent GFR on file in the past 12 months >30    Normal serum potassium on file in past 12 months     BP Readings from Last 3 Encounters:   10/11/23 132/72   03/15/23 112/66   06/20/22 118/78     GFR Estimate   Date Value Ref Range Status   10/11/2023 >90 >60 mL/min/1.73m2 Final   01/05/2021 >90 >60 mL/min/[1.73_m2] Final     Comment:     Non  GFR Calc  Starting 12/18/2018, serum creatinine based estimated GFR (eGFR) will be   calculated using the Chronic Kidney Disease Epidemiology Collaboration   (CKD-EPI) equation.       Potassium   Date Value Ref Range Status   10/11/2023 3.9 3.4 - 5.3 mmol/L Final   08/03/2021 3.8 3.4 - 5.3 mmol/L Final   01/05/2021 3.6 3.4 - 5.3 mmol/L Final

## 2024-11-05 RX ORDER — LISINOPRIL 10 MG/1
10 TABLET ORAL DAILY
Qty: 90 TABLET | Refills: 3 | Status: SHIPPED | OUTPATIENT
Start: 2024-11-05

## 2024-11-12 DIAGNOSIS — I10 ESSENTIAL HYPERTENSION, BENIGN: ICD-10-CM

## 2024-11-13 RX ORDER — AMLODIPINE BESYLATE 10 MG/1
10 TABLET ORAL DAILY
Qty: 90 TABLET | Refills: 3 | Status: SHIPPED | OUTPATIENT
Start: 2024-11-13

## 2024-11-13 NOTE — TELEPHONE ENCOUNTER
AMLODIPINE BESYLATE TABS 10MG       Last Written Prescription Date:  8/15/24  Last Fill Quantity: 90,   # refills: 0  Last Office Visit: 10/11/23  Future Office visit:    Next 5 appointments (look out 90 days)      Nov 19, 2024 2:00 PM  (Arrive by 1:45 PM)  Adult Preventative Visit with Lorena Blake CNP  [facility] ([facility] ) [address]  [facility] [address]  [phone]             Routing refill request to provider for review/approval because:  Calcium Channel Blockers Protocol  [id number]/12/2024 11:56 PM   Protocol Details   Most recent blood pressure under 140/90 in past 12 months    GFR is on file in the past 12 months and most recent GFR is normal     BP Readings from Last 3 Encounters:   10/11/23 132/72   03/15/23 112/66   06/20/22 118/78     GFR Estimate   Date Value Ref Range Status   10/11/2023 >90 >60 mL/min/1.73m2 Final   01/05/2021 >90 >60 mL/min/[1.73_m2] Final     Comment:     Non  GFR Calc  Starting 12/18/2018, serum creatinine based estimated GFR (eGFR) will be   calculated using the Chronic Kidney Disease Epidemiology Collaboration   (CKD-EPI) equation.

## 2025-02-07 ENCOUNTER — TELEPHONE (OUTPATIENT)
Dept: FAMILY MEDICINE | Facility: OTHER | Age: 50
End: 2025-02-07

## 2025-02-07 NOTE — TELEPHONE ENCOUNTER
Telephone call placed to patient.  No answer, message left to return call to clinic at [phone] opt 6.

## 2025-02-07 NOTE — LETTER
February 11, 2025      Everton YUNG Sae  [address] [place] [id number]        Dear ,    We are writing to inform you of your test results.    Test results indicate you may require additional follow up, see comment below.    Resulted Orders   COLOGUARD([employer])   Result Value Ref Range    COLOGUARD-ABSTRACT Positive (A) Negative      Comment:      POSITIVE TEST RESULT. A positive Cologuard result should be followed with a colonoscopy or visual examination of the colon. The normal value (reference range) for this assay is negative.    TEST DESCRIPTION: Composite algorithmic analysis of stool DNA-biomarkers with hemoglobin immunoassay.   Quantitative values of individual biomarkers are not reportable and are not associated with individual biomarker result reference ranges. Cologuard is intended for colorectal cancer screening of adults of either sex, 45 years or older, who are at average-risk for colorectal cancer (CRC). Cologuard has been approved for use by the U.S. FDA. The performance of Cologuard was established in a cross sectional study of average-risk adults aged 50-84. Cologuard performance in patients ages 45 to 49 years was estimated by sub-group analysis of near-age groups. Colonoscopies performed for a positive result may find as the most clinically significant lesion: colorectal cancer [4.0%], advanced adenoma  (including sessile serrated polyps greater than or equal to 1cm diameter) [20%] or non- advanced adenoma [31%]; or no colorectal neoplasia [45%]. These estimates are derived from a prospective cross-sectional screening study of 10,000 individuals at average risk for colorectal cancer who were screened with both Cologuard and colonoscopy. (Yaa Real al, N Engl J Med 2014;370(14):0545-7667.) Cologuard may produce a false negative or false positive result (no colorectal cancer or precancerous polyp present at colonoscopy follow up). A negative Cologuard test result does  not guarantee the absence of CRC or advanced adenoma (pre-cancer). The current Cologuard screening interval is every 3 years. (American Cancer Society and U.S. Multi-Society Task Force). Cologuard performance data in a 10,000 patient pivotal study using colonoscopy as the reference method can be accessed at the following location: www.EyeSpot.Digifeye/results. Additional description of the Cologuard test process,  warnings and precautions can be found at [email or web address].com.         If you have any questions or concerns, please call the clinic at the number listed above.       Sincerely,          Electronically signed  Patsy Torres CNP (covering provider)

## 2025-02-07 NOTE — TELEPHONE ENCOUNTER
Two attempts to call. Disconnected on first and direct to  on second. will have nursing try to reach out re: positive Cologuard. Needs colonoscopy.   Patsy Torres, APRN, CNP (covering provider)

## 2025-02-10 NOTE — TELEPHONE ENCOUNTER
Call returned to patient with no answer, message left on patients voicemail requesting a return call to [phone] option 6.

## 2025-02-11 ENCOUNTER — MYC MEDICAL ADVICE (OUTPATIENT)
Dept: FAMILY MEDICINE | Facility: OTHER | Age: 50
End: 2025-02-11

## 2025-02-11 DIAGNOSIS — R19.5 POSITIVE COLORECTAL CANCER SCREENING USING COLOGUARD TEST: Primary | ICD-10-CM

## 2025-02-11 NOTE — TELEPHONE ENCOUNTER
3rd attempt to reach patient with no answer, message left requesting a return call to [phone] option 6.     A letter has been mailed to patient and a [employer] Message has been sent to patient as well.     Awaiting return call/response from patient.

## 2025-02-17 NOTE — TELEPHONE ENCOUNTER
Call returned to patient, update provided on positive Cologuard results and Patsy Torres CNP recommendation for Colonoscopy.     Patient is willing to have Colonoscopy, would like to have Referral placed to [place] [facility].     Notified once referral is placed, patient will be contacted to schedule.

## 2025-02-17 NOTE — TELEPHONE ENCOUNTER
Sorry. I put the referral in. Did not realize you're PCP. If you want me to cancel mine, let me know. Either way if fine to me.   Patsy Torres, APRN, CNP

## 2025-02-24 ENCOUNTER — TELEPHONE (OUTPATIENT)
Dept: FAMILY MEDICINE | Facility: OTHER | Age: 50
End: 2025-02-24

## 2025-02-24 NOTE — PROGRESS NOTES
Assessment & Plan       Vertigo  - meclizine (ANTIVERT) 25 MG tablet; Take 1 tablet (25 mg) by mouth 3 times daily as needed for dizziness.      Nausea  - ondansetron (ZOFRAN ODT) 4 MG ODT tab; Take 1 tablet (4 mg) by mouth every 8 hours as needed for nausea.      Localized soft tissue swelling  - CT Soft Tissue Neck w/o & w Contrast; Future        MED REC REQUIRED{  Post Medication Reconciliation Status: discharge medications reconciled, continue medications without change          Please continue to take all medication as directed  Please notify your pharmacy or our refill line of future refills needed.  Please return sooner than your next scheduled appointment with any concerns.         Lorena [facility]  [phone]         Everton is a 49 year old, presenting for the following health issues:  ER F/U        ED/UC Followup:  Facility:  [facility]  Date of visit: 02/19/25  Reason for visit: vertigo  Current Status: standing up gets dizzy and happens in sleep      History of Present Ilness   Everton Quevedo is a(n) 49 year old presenting for chief complaint of vertigo on going for the past two weeks. Reports this lasts for a few seconds with head movements then improves. Worse with moving head to right. No numbness tingling. No vision changes, some increase in floaters, has had this previously. Some right ear drainage, has had this for many years, no changes.     ASSESSMENT:   (R42) Vertigo (primary encounter diagnosis)    PLAN:  Orders Placed This Encounter   Medications   meclizine (Antivert) 25 MG tablet   ondansetron (Zofran ODT) 4 MG disintegrating tablet   Meclizine, zofran for BPPV  ENT appt for epley maneuver, discussed in clinic today  Follow up in 1 week with primary care provider if no improvement, sooner if worsening    Electronically signed by Eladio Carranza PA-C         Status:  Mildly improved  Continues with positional vertigo  Has not been doing his head exercises         Soft tissue  swelling left lateral neck  Present for 2 months  Non tender        Patient Active Problem List   Diagnosis    Essential hypertension, benign    GERD (gastroesophageal reflux disease)    Allergic rhinitis    Panic disorder    Morbid obesity (H)     Past Surgical History:   Procedure Laterality Date    CIRCUMCISION      ESOPHAGOSCOPY, GASTROSCOPY, DUODENOSCOPY (EGD), COMBINED  4/22/2013    Procedure: COMBINED ESOPHAGOSCOPY, GASTROSCOPY, DUODENOSCOPY (EGD);  UPPER ENDOSCOPY with Biopsy;  Surgeon: Ashley Narayanan DO;  Location: [facility] OR    ESOPHAGOSCOPY, GASTROSCOPY, DUODENOSCOPY (EGD), COMBINED N/A 10/3/2019    Procedure: UPPER ENDOSCOPY WITH BIOPSIES;  Surgeon: Lionel Rodriguez MD;  Location: [facility] OR    TONSILLECTOMY         Social History     Tobacco Use    Smoking status: Former     Types: Cigars    Smokeless tobacco: Never    Tobacco comments:     Longest period tobacco-free: 6 months; relapse due to social pressure; passive smoke exposure (yes)   Substance Use Topics    Alcohol use: Not Currently     Comment: 3 beers daily/occasionally (?)     Family History   Problem Relation Age of Onset    Cancer Maternal Grandmother         Cause of death    Diabetes Maternal Grandmother 67    Diabetes Maternal Grandfather 70    Diabetes Father                Current Outpatient Medications   Medication Sig Dispense Refill    amLODIPine (NORVASC) 10 MG tablet TAKE 1 TABLET DAILY 90 tablet 3    aspirin 81 MG EC tablet Take 1 tablet (81 mg) by mouth daily 90 tablet 3    atenolol (TENORMIN) 25 MG tablet TAKE 1 TABLET DAILY 90 tablet 3    ibuprofen (ADVIL/MOTRIN) 800 MG tablet TAKE 1 TABLET EVERY 8 HOURS AS NEEDED FOR MODERATE PAIN 90 tablet 4    lisinopril (ZESTRIL) 10 MG tablet TAKE 1 TABLET DAILY 90 tablet 3    LORazepam (ATIVAN) 1 MG tablet TAKE 1 TABLET BY MOUTH DAILY AS NEEDED FOR ANXIETY 30 tablet 3    meclizine (ANTIVERT) 25 MG tablet Take 1 tablet (25 mg) by mouth 3 times daily as needed for dizziness. 30 tablet 1     "montelukast (SINGULAIR) 10 MG tablet TAKE 1 TABLET AT BEDTIME 90 tablet 3    omeprazole (PRILOSEC) 40 MG DR capsule TAKE 1 CAPSULE TWICE A  capsule 1    ondansetron (ZOFRAN ODT) 4 MG ODT tab Take 1 tablet (4 mg) by mouth every 8 hours as needed for nausea. 30 tablet 1         No Known Allergies          Recent Labs   Lab Test 12/13/24  1421 10/11/23  1117 03/15/23  0913 06/20/22  1359 08/03/21  0833 01/05/21  1037 08/30/19  0758   LDL 95  --  65 88   < > 98 95   HDL 42  --  34* 39*   < > 30* 40   TRIG 105  --  102 74   < > 190* 116   ALT 36 21 19  --    < > 254* 171*   CR 0.83 0.93 0.91  --    < > 0.73 0.76   GFRESTIMATED >90 >90 >90  --    < > >90 >90   GFRESTBLACK  --   --   --   --   --  >90 >90   POTASSIUM 3.6 3.9 3.8  --    < > 3.6 3.6   TSH 1.13  --  1.74  --   --  0.89 2.36    < > = values in this interval not displayed.          BP Readings from Last 3 Encounters:   02/25/25 (!) 143/80   12/13/24 138/72   10/11/23 132/72    Wt Readings from Last 3 Encounters:   02/25/25 120.2 kg (265 lb)   12/13/24 123 kg (271 lb 1.6 oz)   10/11/23 114.4 kg (252 lb 3.2 oz)                    Review of Systems  Constitutional, neuro, ENT, endocrine, pulmonary, cardiac, gastrointestinal, genitourinary, musculoskeletal, integument and psychiatric systems are negative, except as otherwise noted.            Objective    BP (!) 143/80 (BP Location: Left arm, Patient Position: Sitting, Cuff Size: Adult Large)   Pulse 61   Temp 98.4  F (36.9  C) (Tympanic)   Resp 18   Ht 1.803 m (5' 11\")   Wt 120.2 kg (265 lb)   SpO2 98%   BMI 36.96 kg/m    Body mass index is 36.96 kg/m .          Physical Exam   GENERAL: alert and no distress  EYES: Eyes grossly normal to inspection, PERRL and conjunctivae and sclerae normal  HENT: both ears: clear effusion  NECK: localized soft tissue swelling - left lateral neck  RESP: lungs clear to auscultation - no rales, rhonchi or wheezes  CV: regular rate and rhythm, normal S1 S2, no S3 or S4, " no murmur, click or rub, no peripheral edema  SKIN: no suspicious lesions or rashes        The longitudinal plan of care for the diagnosis(es)/condition(s) as documented were addressed during this visit. Due to the added complexity in care, I will continue to support Everton in the subsequent management and with ongoing continuity of care.         Signed Electronically by: Lorena Blake CNP

## 2025-02-25 ENCOUNTER — TELEPHONE (OUTPATIENT)
Dept: FAMILY MEDICINE | Facility: OTHER | Age: 50
End: 2025-02-25

## 2025-02-25 ENCOUNTER — OFFICE VISIT (OUTPATIENT)
Dept: FAMILY MEDICINE | Facility: OTHER | Age: 50
End: 2025-02-25
Attending: NURSE PRACTITIONER
Payer: COMMERCIAL

## 2025-02-25 VITALS
HEIGHT: 71 IN | BODY MASS INDEX: 37.1 KG/M2 | SYSTOLIC BLOOD PRESSURE: 143 MMHG | OXYGEN SATURATION: 98 % | WEIGHT: 265 LBS | HEART RATE: 61 BPM | TEMPERATURE: 98.4 F | DIASTOLIC BLOOD PRESSURE: 80 MMHG | RESPIRATION RATE: 18 BRPM

## 2025-02-25 DIAGNOSIS — R11.0 NAUSEA: ICD-10-CM

## 2025-02-25 DIAGNOSIS — S39.012S STRAIN OF LUMBAR REGION, SEQUELA: ICD-10-CM

## 2025-02-25 DIAGNOSIS — R42 VERTIGO: Primary | ICD-10-CM

## 2025-02-25 DIAGNOSIS — R22.9 LOCALIZED SOFT TISSUE SWELLING: ICD-10-CM

## 2025-02-25 RX ORDER — ONDANSETRON 4 MG/1
4 TABLET, ORALLY DISINTEGRATING ORAL EVERY 8 HOURS PRN
Qty: 30 TABLET | Refills: 1 | Status: SHIPPED | OUTPATIENT
Start: 2025-02-25

## 2025-02-25 RX ORDER — MECLIZINE HYDROCHLORIDE 25 MG/1
25 TABLET ORAL 3 TIMES DAILY PRN
Qty: 30 TABLET | Refills: 1 | Status: SHIPPED | OUTPATIENT
Start: 2025-02-25

## 2025-02-25 ASSESSMENT — PAIN SCALES - GENERAL: PAINLEVEL_OUTOF10: NO PAIN (0)

## 2025-02-25 NOTE — TELEPHONE ENCOUNTER
Ibuprofen  Last Written Prescription Date: 12/13/24  Last Fill Quantity: 90 # of Refills: 4  Last Office Visit: 12/13/24

## 2025-02-25 NOTE — PATIENT INSTRUCTIONS
Assessment & Plan       Vertigo  - meclizine (ANTIVERT) 25 MG tablet; Take 1 tablet (25 mg) by mouth 3 times daily as needed for dizziness.      Nausea  - ondansetron (ZOFRAN ODT) 4 MG ODT tab; Take 1 tablet (4 mg) by mouth every 8 hours as needed for nausea.      Localized soft tissue swelling  - CT Soft Tissue Neck w/o & w Contrast; Future        MED REC REQUIRED{  Post Medication Reconciliation Status: discharge medications reconciled, continue medications without change          Please continue to take all medication as directed  Please notify your pharmacy or our refill line of future refills needed.  Please return sooner than your next scheduled appointment with any concerns.         Lorena QUEZADA  [phone]

## 2025-02-25 NOTE — TELEPHONE ENCOUNTER
Screening Questions for the Scheduling of Screening Colonoscopies     (If Colonoscopy is diagnostic, Provider should review the chart before scheduling.)    Are you younger than 50 or older than 80?  Yes, 49 year old    Do you take aspirin or fish oil?  Yes:  (if yes, tell patient to stop 1 week prior to Colonoscopy)    Do you take warfarin (Coumadin), clopidogrel (Plavix), apixaban (Eliquis), dabigatram (Pradaxa), rivaroxaban (Xarelto) or any blood thinner? No    Do you use oxygen at home?  No    Do you have kidney disease? No    Are you on dialysis? No    Have you had a stroke or heart attack in the last year? No    Have you had a stent in your heart or any blood vessel in the last year? No    Have you had a transplant of any organ?  No    Have you had a colonoscopy or upper endoscopy (EGD) before?  No         Date of scheduled Colonoscopy: 3/31/25    Provider: Dr. Atkins     [facility]

## 2025-02-26 RX ORDER — IBUPROFEN 800 MG/1
TABLET, FILM COATED ORAL
Qty: 270 TABLET | Refills: 3 | Status: SHIPPED | OUTPATIENT
Start: 2025-02-26

## 2025-02-26 NOTE — TELEPHONE ENCOUNTER
NSAID Medications [id number]/25/2025 09:11 AM   Protocol Details Always Fail Criteria - Chart Review Required

## 2025-02-28 ENCOUNTER — HOSPITAL ENCOUNTER (OUTPATIENT)
Facility: HOSPITAL | Age: 50
End: 2025-02-28
Attending: SURGERY | Admitting: SURGERY
Payer: COMMERCIAL

## 2025-03-05 ENCOUNTER — HOSPITAL ENCOUNTER (OUTPATIENT)
Dept: CT IMAGING | Facility: HOSPITAL | Age: 50
Discharge: HOME | End: 2025-03-05
Attending: NURSE PRACTITIONER
Payer: COMMERCIAL

## 2025-03-05 DIAGNOSIS — R22.9 LOCALIZED SOFT TISSUE SWELLING: ICD-10-CM

## 2025-03-05 PROCEDURE — 70491 CT SOFT TISSUE NECK W/DYE: CPT

## 2025-03-05 PROCEDURE — 250N000011 HC RX IP 250 OP 636: Performed by: RADIOLOGY

## 2025-03-05 RX ORDER — IOPAMIDOL 755 MG/ML
75 INJECTION, SOLUTION INTRAVASCULAR ONCE
Status: COMPLETED | OUTPATIENT
Start: 2025-03-05 | End: 2025-03-05

## 2025-03-05 RX ADMIN — IOPAMIDOL 75 ML: 755 INJECTION, SOLUTION INTRAVENOUS at 14:08

## 2025-03-05 NOTE — RESULT ENCOUNTER NOTE
Normal CT    Swelling may be a normal variation for him  Monitor  Follow-up as needed      Lorena Blake [facility]  [phone]

## 2025-03-13 ENCOUNTER — TELEPHONE (OUTPATIENT)
Dept: FAMILY MEDICINE | Facility: OTHER | Age: 50
End: 2025-03-13

## 2025-03-13 NOTE — TELEPHONE ENCOUNTER
1:22 PM    Reason for Call: OVERBOOK     Patient is having the following symptoms: Pt called to schedule Pre-Op, scheduled outside of the 2-4 week window. If not Ok, please call patient for appointment sooner. Please note that patient stated the only other day they could make it would be Monday 3/17/25 due to work. Patient is scheduled for 3/21/25 with PCP.    The patient is requesting an appointment for 3/17/25 Colonoscopy with PCP.    Was an appointment offered for this call? Yes  If yes : Appointment type : Pre-Op               Date : 3/21/25    Preferred method for responding to this message: Telephone Call  What is your phone number ? [phone]    If we cannot reach you directly, may we leave a detailed response at the number you provided? Yes    Can this message wait until your PCP/provider returns, if unavailable today? YES    Trinidad Aleman

## 2025-03-19 ENCOUNTER — TELEPHONE (OUTPATIENT)
Dept: FAMILY MEDICINE | Facility: OTHER | Age: 50
End: 2025-03-19

## 2025-03-19 NOTE — TELEPHONE ENCOUNTER
Telephone message received on [facility] Care Team Line requesting a call back from a nurse.    Telephone call returned to patient to review ER follow up questionnaire.    Date of ER/UC Visit:  See below. Patient reports going to ER 3 times this week at [facility].      Location:   2 times in [place] and last night in [place].  3/12/25, 3/15/25, 3/18/25    Reason for ER/UC Visit:  Upper abdominal pain- starts in abdomen and wraps around back.    Medication Changes:    Carafate  Mylanta  pepcid    Medication picked up/started: Yes    Symptoms improved or worsened: Nothing is helping.  Has had to miss 2 days of work for 9/10 pain.    ER/UC follow up recommended:    Patients states they are recommending upper scope- he is scheduled for colonoscopy 3/31/25 with Dr. Atkins.    Patient states EKG done at each visit, has had CT Scan and chest x-ray.  States they mentioned having ABD US done but has not been called to set that up.    Questions/concern: Is there anything PCP can prescribe for pain.    Appointment Scheduled:      Has pre-op scheduled with PCP on 3/21/25

## 2025-03-19 NOTE — TELEPHONE ENCOUNTER
Telephone call returned to patient.  Reassured that notes from [facility] were visible to PCP and also reviewed by this RN CC.  Discussed medications ordered at ER and patient reports taking them as ordered.  Discussed sticking to a bland diet and avoiding fatty foods.  Updated patient that PCP will follow with ER concerns at appointment on 3/21/25 as noted in this encounter.  Patient asks if he should return to the ER if pain becomes unbearable, RN CC advised that plan is appropriate.

## 2025-03-19 NOTE — TELEPHONE ENCOUNTER
Symptom or reason needing to speak to RN: Stomach Issues, has gone to the ER 3 times in the past week , has Pre-Op on Friday for Colonoscopy  ER Follow UP / [facility] in [facility] as well as [facility] in [facility] (last night)    Best number to return call: [phone]    Best time to return call: Anytime today or tomorrow

## 2025-03-21 PROBLEM — K76.0 HEPATIC STEATOSIS: Status: ACTIVE | Noted: 2025-03-21

## 2025-03-23 ENCOUNTER — MYC MEDICAL ADVICE (OUTPATIENT)
Dept: FAMILY MEDICINE | Facility: OTHER | Age: 50
End: 2025-03-23

## 2025-03-24 NOTE — OR NURSING
Spoke with pt for PAT screen for colonoscopy with Dr Atkins on 3/31/25.  States he is in [facility] and had his gallbladder removed on 3/22 and an ERCP on 3/24.  Will update the pool for pt to reschedule.

## 2025-03-25 ENCOUNTER — TELEPHONE (OUTPATIENT)
Dept: FAMILY MEDICINE | Facility: OTHER | Age: 50
End: 2025-03-25

## 2025-03-25 NOTE — TELEPHONE ENCOUNTER
"Date of Hospital Admission: 3/22/25    Date of Hospital Discharge: 3/24/25    Location: [facility]     Reason for Hospital Admission: Calculus of gallbladder and bile duct with acute cholecystitis, with obstruction     Medication Changes Upon Discharge:    Ordered Prescriptions  Prescription Sig Dispense Quantity Refills Last Filled Start Date End Date   senna-docusate (Senokot-S) 8.6-50 MG oral tablet  Take 2 Tablets by mouth one time a day for 5 days. 10 Tablet    03/24/2025 1:44 PM CDT 03/24/2025 03/29/2025   polyethylene glycol 3350 (Miralax) powder  Take 17 g by mouth one time a day for 5 days. Mix in 8 oz of water, juice, soda, coffee, or tea prior to administration. 119 g    03/24/2025 1:44 PM CDT 03/24/2025 03/31/2025   oxyCODONE (Roxicodone) 5 MG immediate release tablet  Take 1 Tablet by mouth every four hours as needed for Pain. 10 Tablet    03/24/2025 1:44 PM CDT 03/24/2025     acetaminophen (TYLENOL) 325 MG tablet  Take 2 Tablets by mouth every four hours as needed for Pain. Limit acetaminophen to 4000 mg per day from all sources.       03/24/2025     omeprazole (PriLOSEC) 40 MG delayed-release capsule  Take 1 Capsule by mouth one time a day for 10 days. Take before meals. Do not crush. 10 Capsule      03/24/2025 04/03/2025       Medications picked up/started: Yes    Symptoms improved or worsened: Improved    Hospital Follow Up Recommended: Follow up in one week with PCP, 2 weeks via telephone with acute surgery    Questions/Concerns: none    Appointment Scheduled:  Future Appointments 3/25/2025 - 9/21/2025        Date Visit Type Length Department Provider     4/2/2025  1:30 PM ED/HOSP FOLLOW UP 30 min Lorena Odonnell CNP    [facility] Instructions:     [facility] [address] on [address]. Enter through the front entrance to register for your appointment.\"              6/13/2025  2:00 PM OFFICE VISIT 30 min [facility] " "PRACTICE Lorena Blake, LUIS ALBERTO    Location Instructions:     \"[facility] is located off  on [address]. Enter through the front entrance to register for your appointment.\"                       "

## 2025-03-25 NOTE — TELEPHONE ENCOUNTER
Symptom or reason needing to speak to RN: ER Follow Up / Emergency Surgery of Gulbladder / [facility] [place] / Admission to ER: 3/22/25, Emergency Surgery : 3/22/25 and 3/23/25     Best number to return call: [phone]     Best time to return call: Anytime

## 2025-03-26 ENCOUNTER — NURSE TRIAGE (OUTPATIENT)
Dept: FAMILY MEDICINE | Facility: OTHER | Age: 50
End: 2025-03-26

## 2025-03-26 ENCOUNTER — OFFICE VISIT (OUTPATIENT)
Dept: FAMILY MEDICINE | Facility: OTHER | Age: 50
End: 2025-03-26
Attending: NURSE PRACTITIONER
Payer: COMMERCIAL

## 2025-03-26 VITALS
BODY MASS INDEX: 36.34 KG/M2 | RESPIRATION RATE: 18 BRPM | HEIGHT: 71 IN | DIASTOLIC BLOOD PRESSURE: 88 MMHG | WEIGHT: 259.6 LBS | HEART RATE: 88 BPM | SYSTOLIC BLOOD PRESSURE: 132 MMHG | TEMPERATURE: 98.6 F | OXYGEN SATURATION: 93 %

## 2025-03-26 DIAGNOSIS — L03.114 CELLULITIS OF LEFT UPPER EXTREMITY: Primary | ICD-10-CM

## 2025-03-26 RX ORDER — OXYCODONE HYDROCHLORIDE 5 MG/1
5 TABLET ORAL
COMMUNITY
Start: 2025-03-24

## 2025-03-26 RX ORDER — AMOXICILLIN 250 MG
2 CAPSULE ORAL
COMMUNITY
Start: 2025-03-24 | End: 2025-03-29

## 2025-03-26 RX ORDER — CEPHALEXIN 500 MG/1
500 CAPSULE ORAL 3 TIMES DAILY
Qty: 30 CAPSULE | Refills: 0 | Status: SHIPPED | OUTPATIENT
Start: 2025-03-26 | End: 2025-04-05

## 2025-03-26 ASSESSMENT — PAIN SCALES - GENERAL: PAINLEVEL_OUTOF10: MILD PAIN (1)

## 2025-03-26 NOTE — TELEPHONE ENCOUNTER
Nurse Triage SBAR    Is this a 2nd Level Triage? NO    Situation: Patient has redness, swelling, pain, and pus at previous IV site.    Background: IV placed 3/22/25 and removed 3/24/25 when discharged from hospital.  States he called and received advise to apply ice and heat to IV site     Assessment: Redness present at IV site of left arm where IV was removed at hospital stay for removal of gall bladder.  Patient states about the size of a silver dollar.  Pain is increasing and is becoming bothersome, reports 4/10.  States there was a white head on it filled with pus.  Denies fever, checked this morning after waking up sweaty.  Patient was advised by hospital to alternate between ice and heat for a few days.  Patient reports worsening of pain over the past day.    Protocol Recommended Disposition:   Go To Office Now    Recommendation: Patient scheduled.       Does the patient meet one of the following criteria for ADS visit consideration? No    Reason for Disposition   Central line (e.g., Broviac, Tavares, Port) or PICC line has moved out of position (or can see cuff slipping out of skin; or more line externally exposed than before)    Additional Information   Negative: SEVERE difficulty breathing (e.g., struggling for each breath, speaks in single words)   Negative: Shock suspected (e.g., cold/pale/clammy skin, too weak to stand, low BP, rapid pulse)   Negative: Cracked or broken central line (e.g., Broviac, Tavares, Port) or PICC line   Negative: Sounds like a life-threatening emergency to the triager   Negative: IV not running or running slowly   Negative: Chest pain and has central line (e.g., Broviac, Tavares, Port) or PICC line   Negative: Moderate bleeding around IV site  (Exception: Mild bleeding that stops quickly with direct pressure.)   Negative: Difficulty breathing and has central line (e.g., Broviac, Tavares, Port) or PICC line   Negative: Chest or neck swelling and has a central or PICC line   "(Exception: Mild puffiness or swelling just around IV site.)   Negative: Arm or leg swelling and has a central or PICC line  (Exception: Mild puffiness or swelling just around IV site.)   Negative: Fever > 100.4 F (38.0 C)   Negative: Patient sounds very sick or weak to the triager    Answer Assessment - Initial Assessment Questions  1. SYMPTOM:  \"What's the main symptom you're concerned about?\" (e.g., pain, redness, swelling, pus)      Pain, redness, swelling, warm  2. ONSET: \"When did the symptoms  start?\"      Yesterday became more painful  3. IV TYPE: \"What kind of IV line do you have?\" (e.g., central line, PICC, peripheral IV)      Peripheral IV  4. IV LOCATION - SITE: \"Where does the IV enter your body?\"      Left arm, a little up from the wrist  5. IV START DATE: \"When was this IV put in?\"      3/22/25  6. IV REASON: \"Why do you have this IV line?\"      Surgery- gall bladder removal  7. IV FUNCTION: \"Describe how the IV is running?\" (e.g., running normally, running slowly, not running, unable to flush)       IV  removed due to hospital discharge  8. PAIN: \"Is there any pain?\" If Yes, ask: \"How bad is the pain?\" (Scale 1-10; or mild, moderate, severe) \"Describe the pain.\" (e.g., burning, throbbing, shooting, sharp, etc.)      4/10  9. SWELLING: \"Is there any swelling at your IV site?\"       1/2 dollar or a little bit bigger area of swelling  10. FEVER: \"Do you have a fever?\" If Yes, ask: \"What is your temperature, how was it measured, and when did it start?\"         No  11. OTHER SYMPTOMS: \"Do you have any other symptoms?\" (e.g., shaking chills, weakness)        Sweating this morning upon wakening  12. VISITING NURSE: \"Do you have a visiting nurse?\" (e.g., home health nurse, IV infusion nurse)        NA  13. PUMP: \"Is it on a pump?\" If Yes,, ask: \"Is there an alarm and what is the message?\"        NA    Protocols used: IV Site and Other Symptoms-A-OH    "

## 2025-03-26 NOTE — PROGRESS NOTES
Assessment & Plan         Cellulitis of left upper extremity  - cephALEXin (KEFLEX) 500 MG capsule; Take 1 capsule (500 mg) by mouth 3 times daily for 10 days.  - Monitor for temp  - Clean with Hibiclens twice daily  - Keep area clean and dry          MED REC REQUIRED{  Post Medication Reconciliation Status: discharge medications reconciled, continue medications without change        Lorena Blake [facility]  [phone]         Everton is a 49 year old, presenting for the following health issues:  forearm wound          Concern - Swelling, pain, redness, pus at previous IV site on left forearm.  Onset: Yesterday 3/25/25  Intensity: 4/10  Progression of Symptoms:  worsening and constant  Accompanying Signs & Symptoms:  Sweating this morning upon wakening  Previous history of similar problem:   No  Precipitating factors:   Worsened by: nothing  Alleviating factors:  Improved by: nothing  Therapies Tried and outcome: Advised to alternate ice pack and heating pack           Cade Prabhakar PA-C  Physician Assistant  Trauma     Discharge Summary     Signed     Date of Service: 3/24/2025 11:41 AM  Creation Time: 3/24/2025 11:41 AM  Note Received: 3/26/2025  3:51 PM        3/24/2025  DISCHARGE SUMMARY  Cade Prabhakar PA-C       Patient Name: Everton Quevedo   YOB: 1975  Age: 49 year old   Medical Record Number: [id number]   Primary Physician: Pcp Elsewhere   Referring Physician/Facility: Aristides Ann DO  Admission Date: 3/22/2025   Discharge Date: 3/24/2025  Primary admitting diagnosis:   Principal Problem:    Calculus of gallbladder and bile duct with acute cholecystitis, with obstruction  Active Problems:    Hepatic steatosis    GERD (gastroesophageal reflux disease)    Essential hypertension, benign    Elevated LFTs    Abdominal pain    Nausea and vomiting             Admitting Provider: Michael Lundy MD     Discharging Provider:  Cade Prabhakar PA-C     Consultants:   IP CONSULT TO GI  IP  CONSULT ACUTE CARE SURGERY  ANESTHESIA FOLLOW UP     Procedures:   Laparoscopic cholecystectomy, Dr. Del Angel, Dr. Santos  ERCP, sphincterotomy, stone extraction, biliary stent placement, Dr. Corley     Imaging:   US ABDOMEN LIMITED     HISTORY: Upper abdominal pain for 2 weeks waxing and waning.  Last time he ate was 11 PM.  Questionable gallbladder disease;      FINDINGS:     The IVC is patent.      The right kidney measures 12 cm.  There is no hydronephrosis.       The common extrahepatic duct measures 7.0 mm.     Pancreas is partially obscured by bowel gas. Imaged portion of pancreas is unremarkable.     The liver appears enlarged and diffusely hyperechoic. Diffuse gallbladder wall thickening up to 7 mm. Sonographic Jones sign is negative. No gallstone is identified.     The liver utiabhzt29.1 cm.        IMPRESSION:  Nonspecific gallbladder wall thickening without sonographic evidence of cholelithiasis. Sonographic Jones sign is negative.  Hepatomegaly and diffuse hepatic steatosis.  Mild dilation of the common duct at 7 mm.     CT ABDOMEN PELVIS W IV CONTRAST     HISTORY: Abdominal pain, acute, nonlocalized; Region of Interest and Additional Information->Upper abdominal pain with vomiting for 2 weeks.  Ultrasound gallbladder essentially negative.  IV contrast only        FINDINGS:  Spleen and adrenals are normal.     Kidneys are symmetric. There is no hydronephrosis.     Pancreas appears normal.     Few nonspecific mildly enlarged periportal lymph nodes.     Liver is enlarged with diffuse steatosis.     Mild gallbladder wall thickening and nonspecific pericholecystic fat infiltration.     Aorta is normal caliber. Bladder and prostate are unremarkable. Bowel is normal caliber. There are numerous colonic diverticula.     No fluid collection or free air.     IMPRESSION:     1.  Gallbladder wall thickening with mild pericholecystic fat infiltration. Acalculous cholecystitis cannot be excluded. No other evidence  of acute inflammation is identified elsewhere in the abdomen.  2.  Hepatomegaly and diffuse hepatic steatosis.     Hospital Summary: Deepak Quevedo is a 49 year old male who was admitted with abdominal pain, elevated LFTs and dilatation of his common bile duct. He was taken for lap anne on 3/22 and found to have parkland grade 4 cholecystitis with IOC showing a filling defect in the CBD. He was then taken for ERCP by Dr. Corley on 3/23. A stent was placed as there was concern for risk of bile leak given the large size of his cystic duct requiring endo loop. He was stable for discharge the following day.         Patient Active Problem List   Diagnosis    Essential hypertension, benign    GERD (gastroesophageal reflux disease)    Allergic rhinitis    Panic disorder    Morbid obesity (H)    Hepatic steatosis     Past Surgical History:   Procedure Laterality Date    CIRCUMCISION      ESOPHAGOSCOPY, GASTROSCOPY, DUODENOSCOPY (EGD), COMBINED  4/22/2013    Procedure: COMBINED ESOPHAGOSCOPY, GASTROSCOPY, DUODENOSCOPY (EGD);  UPPER ENDOSCOPY with Biopsy;  Surgeon: Ashley Narayanan DO;  Location: [facility] OR    ESOPHAGOSCOPY, GASTROSCOPY, DUODENOSCOPY (EGD), COMBINED N/A 10/3/2019    Procedure: UPPER ENDOSCOPY WITH BIOPSIES;  Surgeon: Lionel Rodriguez MD;  Location: [facility] OR    TONSILLECTOMY         Social History     Tobacco Use    Smoking status: Former     Types: Cigars    Smokeless tobacco: Never    Tobacco comments:     Longest period tobacco-free: 6 months; relapse due to social pressure; passive smoke exposure (yes)   Substance Use Topics    Alcohol use: Not Currently     Comment: 3 beers daily/occasionally (?)     Family History   Problem Relation Age of Onset    Cancer Maternal Grandmother         Cause of death    Diabetes Maternal Grandmother 67    Diabetes Maternal Grandfather 70    Diabetes Father              Current Outpatient Medications   Medication Sig Dispense Refill    amLODIPine (NORVASC) 10 MG tablet  TAKE 1 TABLET DAILY 90 tablet 3    aspirin 81 MG EC tablet Take 1 tablet (81 mg) by mouth daily 90 tablet 3    atenolol (TENORMIN) 25 MG tablet TAKE 1 TABLET DAILY 90 tablet 3    ibuprofen (ADVIL/MOTRIN) 800 MG tablet TAKE 1 TABLET EVERY 8 HOURS AS NEEDED FOR MODERATE PAIN 270 tablet 3    lisinopril (ZESTRIL) 10 MG tablet TAKE 1 TABLET DAILY 90 tablet 3    LORazepam (ATIVAN) 1 MG tablet TAKE 1 TABLET BY MOUTH DAILY AS NEEDED FOR ANXIETY 30 tablet 3    meclizine (ANTIVERT) 25 MG tablet Take 1 tablet (25 mg) by mouth 3 times daily as needed for dizziness. 30 tablet 1    montelukast (SINGULAIR) 10 MG tablet TAKE 1 TABLET AT BEDTIME 90 tablet 3    omeprazole (PRILOSEC) 40 MG DR capsule TAKE 1 CAPSULE TWICE A  capsule 1    ondansetron (ZOFRAN ODT) 4 MG ODT tab Take 1 tablet (4 mg) by mouth every 8 hours as needed for nausea. 30 tablet 1    oxyCODONE (ROXICODONE) 5 MG tablet Take 5 mg by mouth.      senna-docusate (SENOKOT-S/PERICOLACE) 8.6-50 MG tablet Take 2 tablets by mouth.             No Known Allergies        Recent Labs   Lab Test 03/21/25  1025 12/13/24  1421 10/11/23  1117 03/15/23  0913 06/20/22  1359 08/03/21  0833 01/05/21  1037 08/30/19  0758   A1C 5.1  --   --   --   --   --   --   --    LDL  --  95  --  65 88   < > 98 95   HDL  --  42  --  34* 39*   < > 30* 40   TRIG  --  105  --  102 74   < > 190* 116   ALT 21 36 21 19  --    < > 254* 171*   CR 0.92 0.83 0.93 0.91  --    < > 0.73 0.76   GFRESTIMATED >90 >90 >90 >90  --    < > >90 >90   GFRESTBLACK  --   --   --   --   --   --  >90 >90   POTASSIUM 3.5 3.6 3.9 3.8  --    < > 3.6 3.6   TSH  --  1.13  --  1.74  --   --  0.89 2.36    < > = values in this interval not displayed.          BP Readings from Last 3 Encounters:   03/26/25 132/88   03/21/25 127/84   02/25/25 (!) 143/80    Wt Readings from Last 3 Encounters:   03/26/25 117.8 kg (259 lb 9.6 oz)   03/21/25 119.3 kg (263 lb)   02/25/25 120.2 kg (265 lb)                   Review of  "Systems  Constitutional, neuro, ENT, endocrine, pulmonary, cardiac, gastrointestinal, genitourinary, musculoskeletal, integument and psychiatric systems are negative, except as otherwise noted.            Objective    /88 (BP Location: Right arm, Patient Position: Sitting, Cuff Size: Adult Regular)   Pulse 88   Temp 98.6  F (37  C) (Tympanic)   Resp 18   Ht 1.803 m (5' 11\")   Wt 117.8 kg (259 lb 9.6 oz)   SpO2 93%   BMI 36.21 kg/m    Body mass index is 36.21 kg/m .        Physical Exam   GENERAL: alert and no distress  NECK: no adenopathy, no asymmetry, masses, or scars  RESP: lungs clear to auscultation - no rales, rhonchi or wheezes  CV: regular rate and rhythm, normal S1 S2, no S3 or S4, no murmur, click or rub, no peripheral edema  SKIN: left forearm - erythema, tissue warmth at IV insertion site - no active drainage  PSYCH: mentation appears normal, affect normal/bright          The longitudinal plan of care for the diagnosis(es)/condition(s) as documented were addressed during this visit. Due to the added complexity in care, I will continue to support Everton in the subsequent management and with ongoing continuity of care.             Signed Electronically by: Lorena Blake CNP    "

## 2025-03-26 NOTE — TELEPHONE ENCOUNTER
Symptom or reason needing to speak to RN: Had emergency gall bladder surgery on  3-22 and 3-23-25 - IV site red, swollen, sore - patient had a warm compress on the site - it is also warm    Best number to return call: [phone]      Best time to return call: ANY

## 2025-03-26 NOTE — PATIENT INSTRUCTIONS
Assessment & Plan         Cellulitis of left upper extremity  - cephALEXin (KEFLEX) 500 MG capsule; Take 1 capsule (500 mg) by mouth 3 times daily for 10 days.  - Monitor for temp  - Clean with Hibiclens twice daily  - Keep area clean and dry          MED REC REQUIRED{  Post Medication Reconciliation Status: discharge medications reconciled, continue medications without change        Lorena SHELL  [phone]

## 2025-04-06 DIAGNOSIS — J30.1 CHRONIC SEASONAL ALLERGIC RHINITIS DUE TO POLLEN: ICD-10-CM

## 2025-04-07 RX ORDER — MONTELUKAST SODIUM 10 MG/1
1 TABLET ORAL AT BEDTIME
Qty: 90 TABLET | Refills: 3 | Status: SHIPPED | OUTPATIENT
Start: 2025-04-07

## 2025-06-16 ENCOUNTER — RESULTS FOLLOW-UP (OUTPATIENT)
Dept: FAMILY MEDICINE | Facility: OTHER | Age: 50
End: 2025-06-16

## (undated) DEVICE — MOUTHPIECE W/GUARD FOR ENDOSCOPY

## (undated) DEVICE — TUBING-SUCTION 20FT

## (undated) DEVICE — APPLICATOR-CHLORAPREP 26ML TINTED CHG 2%+ 70% IPA-SURGICAL

## (undated) DEVICE — IRRIGATION-H2O 1000ML BAG

## (undated) DEVICE — LIGHT HANDLE COVER

## (undated) DEVICE — FORCEP-COLON BIOPSY STD W/NEEDLE 160CM

## (undated) DEVICE — CANISTER-SUCTION 2000CC

## (undated) RX ORDER — LIDOCAINE HYDROCHLORIDE 20 MG/ML
INJECTION, SOLUTION EPIDURAL; INFILTRATION; INTRACAUDAL; PERINEURAL
Status: DISPENSED
Start: 2019-10-03

## (undated) RX ORDER — PROPOFOL 10 MG/ML
INJECTION, EMULSION INTRAVENOUS
Status: DISPENSED
Start: 2019-10-03